# Patient Record
Sex: MALE
[De-identification: names, ages, dates, MRNs, and addresses within clinical notes are randomized per-mention and may not be internally consistent; named-entity substitution may affect disease eponyms.]

---

## 2017-08-07 PROBLEM — Z00.00 ENCOUNTER FOR PREVENTIVE HEALTH EXAMINATION: Status: ACTIVE | Noted: 2017-08-07

## 2017-10-04 ENCOUNTER — APPOINTMENT (OUTPATIENT)
Dept: ENDOCRINOLOGY | Facility: CLINIC | Age: 67
End: 2017-10-04

## 2018-02-14 ENCOUNTER — MEDICATION RENEWAL (OUTPATIENT)
Age: 68
End: 2018-02-14

## 2018-03-04 ENCOUNTER — MEDICATION RENEWAL (OUTPATIENT)
Age: 68
End: 2018-03-04

## 2018-03-05 ENCOUNTER — MEDICATION RENEWAL (OUTPATIENT)
Age: 68
End: 2018-03-05

## 2018-04-02 ENCOUNTER — MEDICATION RENEWAL (OUTPATIENT)
Age: 68
End: 2018-04-02

## 2018-06-01 ENCOUNTER — MEDICATION RENEWAL (OUTPATIENT)
Age: 68
End: 2018-06-01

## 2018-06-06 ENCOUNTER — APPOINTMENT (OUTPATIENT)
Dept: ENDOCRINOLOGY | Facility: CLINIC | Age: 68
End: 2018-06-06
Payer: MEDICARE

## 2018-06-06 VITALS
SYSTOLIC BLOOD PRESSURE: 126 MMHG | HEART RATE: 69 BPM | TEMPERATURE: 98 F | WEIGHT: 175 LBS | HEIGHT: 66 IN | OXYGEN SATURATION: 96 % | BODY MASS INDEX: 28.12 KG/M2 | DIASTOLIC BLOOD PRESSURE: 73 MMHG

## 2018-06-06 DIAGNOSIS — Z82.5 FAMILY HISTORY OF ASTHMA AND OTHER CHRONIC LOWER RESPIRATORY DISEASES: ICD-10-CM

## 2018-06-06 DIAGNOSIS — Z80.42 FAMILY HISTORY OF MALIGNANT NEOPLASM OF PROSTATE: ICD-10-CM

## 2018-06-06 DIAGNOSIS — H91.93 UNSPECIFIED HEARING LOSS, BILATERAL: ICD-10-CM

## 2018-06-06 DIAGNOSIS — N52.9 MALE ERECTILE DYSFUNCTION, UNSPECIFIED: ICD-10-CM

## 2018-06-06 PROCEDURE — 99214 OFFICE O/P EST MOD 30 MIN: CPT

## 2018-06-07 ENCOUNTER — MEDICATION RENEWAL (OUTPATIENT)
Age: 68
End: 2018-06-07

## 2018-06-08 PROBLEM — H91.93 HEARING DEFICIT, BILATERAL: Status: ACTIVE | Noted: 2018-06-06

## 2018-06-09 RX ORDER — SILDENAFIL CITRATE 50 MG/1
50 TABLET, FILM COATED ORAL
Refills: 0 | Status: ACTIVE | COMMUNITY
Start: 2018-06-09

## 2018-07-02 ENCOUNTER — MEDICATION RENEWAL (OUTPATIENT)
Age: 68
End: 2018-07-02

## 2018-10-22 ENCOUNTER — APPOINTMENT (OUTPATIENT)
Dept: ENDOCRINOLOGY | Facility: CLINIC | Age: 68
End: 2018-10-22
Payer: MEDICARE

## 2018-10-22 VITALS
HEART RATE: 78 BPM | BODY MASS INDEX: 28.12 KG/M2 | OXYGEN SATURATION: 95 % | WEIGHT: 175 LBS | HEIGHT: 66 IN | TEMPERATURE: 97.9 F | DIASTOLIC BLOOD PRESSURE: 63 MMHG | SYSTOLIC BLOOD PRESSURE: 135 MMHG

## 2018-10-22 PROCEDURE — 99214 OFFICE O/P EST MOD 30 MIN: CPT

## 2019-01-22 ENCOUNTER — MEDICATION RENEWAL (OUTPATIENT)
Age: 69
End: 2019-01-22

## 2019-03-24 ENCOUNTER — MEDICATION RENEWAL (OUTPATIENT)
Age: 69
End: 2019-03-24

## 2019-04-25 ENCOUNTER — APPOINTMENT (OUTPATIENT)
Dept: ENDOCRINOLOGY | Facility: CLINIC | Age: 69
End: 2019-04-25
Payer: MEDICARE

## 2019-04-25 VITALS
BODY MASS INDEX: 28 KG/M2 | DIASTOLIC BLOOD PRESSURE: 78 MMHG | WEIGHT: 174.25 LBS | SYSTOLIC BLOOD PRESSURE: 148 MMHG | HEIGHT: 66 IN | OXYGEN SATURATION: 95 % | HEART RATE: 73 BPM | TEMPERATURE: 97.8 F

## 2019-04-25 PROCEDURE — 99214 OFFICE O/P EST MOD 30 MIN: CPT

## 2019-04-27 NOTE — PHYSICAL EXAM
[Alert] : alert [Well Nourished] : well nourished [Healthy Appearance] : healthy appearance [Normal Sclera/Conjunctiva] : normal sclera/conjunctiva [PERRL] : pupils equal, round and reactive to light [EOMI] : extra ocular movement intact [No Proptosis] : no proptosis [No Lid Lag] : no lid lag [Normal Outer Ear/Nose] : the ears and nose were normal in appearance [No Neck Mass] : no neck mass was observed [No LAD] : no lymphadenopathy [Thyroid Not Enlarged] : the thyroid was not enlarged [Clear to Auscultation] : lungs were clear to auscultation bilaterally [Normal Rate] : heart rate was normal  [Regular Rhythm] : with a regular rhythm [Carotids Normal] : carotid pulses were normal with no bruits [No Edema] : there was no peripheral edema [Not Tender] : non-tender [Soft] : abdomen soft [No HSM] : no hepato-splenomegaly [Normal] : normal and non tender [No CVA Tenderness] : no ~M costovertebral angle tenderness [Normal Gait] : normal gait [No Joint Swelling] : no joint swelling seen [Normal Strength/Tone] : muscle strength and tone were normal [No Involuntary Movements] : no involuntary movements were seen [No Rash] : no rash [No Tremors] : no tremors [Normal Sensation on Monofilament Testing] : normal sensation on monofilament testing of lower extremities [Normal Affect] : the affect was normal [Normal Mood] : the mood was normal [Gynecomastia] : no gynecomastia [Kyphosis] : no kyphosis present [Foot Ulcers] : no foot ulcers [Acanthosis Nigricans] : no acanthosis nigricans [de-identified] : No corneal arcus or xanthelasma [de-identified] : Hearing (with his hearing aids) is grossly normal [de-identified] : No murmurs [de-identified] : DP pulses 3+ bilaterally [de-identified] : No cervical or supraclavicular adenopathy [de-identified] : Vibratory sensation mildly decreased over the toes

## 2019-04-27 NOTE — ASSESSMENT
[FreeTextEntry1] : 1) Type 1 DM:  A1c level remains moderately elevated, with the usual barriers to improved control--intrinsic "brittleness" (especially overnight) plus under-bolusing for meals because of frequent hypoglycemia between meals from increased physical activity (plus his fear of hypoglycemia).\par --Discussed with him once again that he is clearly a candidate for a pump--which would almost certainly help his overnight glycemic instability, plus decrease the frequency of daytime hypoglycemia because he would be able to use temporary (decreased) basal rates during periods of increased physical activity.  He remains reluctant to pursue this (though seems to be giving it more consideration than in the past).\par --Given his unstable glycemic profile and frequent hypoglycemia, he is also a candidate for a CGM (which would probably be a good first step toward introducing him to current diabetes "technology.")  \par Discussed the CGM with him once again, and emphasized that Medicare is now covering Dexcom devices,  Pointed out that the CGM would not only warn him about hypoglycemia, but also warn him about rapidly falling blood sugars so that he could "intercept" any impending hypoglycemia.  The CGM would also allow him (and me) to assess his overnight glycemic trends.  He indicated that he would consider this.\par --For now, given his elevated morning fingersticks, rising blood sugars overnight and lack of overnight hypoglycemia, will increase his Basaglar back to 16 units.\par \par 2) Exertional dyspnea:  Unclear whether his dyspnea is actually provoked by exertion.  Appears to be most noticeable when he is bending over or when crouching for a long period of time.  His cardiac and pulmonary workups were also negative--(though given his type I diabetes, it is possible that he has mild LV dysfunction from small vessel disease).\par \par Pt is to let me know if he will be pursuing obtaining a Dexcom.\par See for follow-up in 4-5 months.  CMP, lipids, A1c, TFTs before the visit. [FreeTextEntry2] : Advantages of insulin pumps, CGMs

## 2019-04-27 NOTE — DATA REVIEWED
[FreeTextEntry1] : [facility]  (4/19/19)\par \par ,  A1c 8.0%\par LDL 47, HDL 51, , non-HDL cholesterol 85\par Urine microalbumin undetectable

## 2019-04-27 NOTE — REVIEW OF SYSTEMS
[Fatigue] : no fatigue [Decreased Appetite] : appetite not decreased [Recent Weight Gain (___ Lbs)] : no recent weight gain [Recent Weight Loss (___ Lbs)] : no recent weight loss [Fever] : no fever [Chills] : no chills [Blurry Vision] : no blurred vision [Dry Eyes] : no dryness of the eyes [Eyes Itch] : no itching of the eyes [Dysphagia] : no dysphagia [Chest Pain] : no chest pain [Palpitations] : no palpitations [Lower Ext Edema] : no lower extremity edema [Shortness Of Breath] : no shortness of breath [Wheezing] : no wheezing was heard [Nausea] : no nausea [Constipation] : no constipation [Diarrhea] : no diarrhea [Heartburn] : no heartburn [Polyuria] : no polyuria [Dysuria] : no dysuria [Joint Pain] : no joint pain [Joint Stiffness] : no joint stiffness [Muscle Cramps] : no muscle cramps [Myalgia] : no myalgia  [Hair Loss] : no hair loss [Dry Skin] : no dry skin [Headache] : no headaches [Tremors] : no tremors [Pain/Numbness of Digits] : no pain/numbness of digits [Difficulty Walking] : no difficulty walking [Depression] : no depression [Anxiety] : no anxiety [Insomnia] : no insomnia [Stress] : no stress [Polydipsia] : no polydipsia [Cold Intolerance] : cold tolerant [Heat Intolerance] : heat tolerant [Easy Bleeding] : no ~M tendency for easy bleeding [Easy Bruising] : no tendency for easy bruising [FreeTextEntry4] : Recent post-nasal drip.  Hearing aids are working satisfactorily. [FreeTextEntry6] : Intermittent cough which he thinks is secondary to the post-nasal drip.  See comments in the HPI re: ERIC [FreeTextEntry8] : Nocturia usually once a night

## 2019-04-27 NOTE — HISTORY OF PRESENT ILLNESS
[FreeTextEntry1] : 69-year-old man who is followed for type I DM.  His diabetes was initially diagnosed in 2003, when he presented with marked hyperglycemia and ketosis.  He has never been in fransico DKA.  His glycemic control has been consistently sub-optimal, with A1c levels which are usually in the 7.5-8.5% range.  The main barriers to improved control are his intrinsic brittleness and his tendency to under-bolus for meals because of frequent hypoglycemia between meals as a result of his physical activity.  Despite the sub-optimal control, he has no complicating retinopathy or nephropathy.  His other active medical problems include BPH and chronic REYES.  A full cardiac work-up and PFTs were done earlier this year to evaluate the REYES, but no cause could be found.\par \francis Continues to test fingersticks 5X/day (with at least one of these each day to check for suspected hypoglycemia), and take 4-5 insulin injections/day. \francis Has been taking 15 units of Basaglar qhs.  Has not had any AM or symptomatic overnight hypoglycemia, but has now noted higher pre-breakfast readings--i.e. usually > 200.  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  He has noted particularly high fingersticks the next morning (i.e. probably from rebound) after these episodes.\francis Taking 3-5 units of Novolog for breakfast, which is bran cereal nearly every morning.  He has not tested any fingersticks after the meal , but has not had any hypoglycemic reactions between breakfast and lunch.\francis Takes 4 units of Novolog for lunch, which is usually a sandwich.  His pre-lunch fingersticks are usually in the mid-100 range, but he has rarely tested after the meal.  He has had only a few hypoglycemic reactions in the mid-afternoon.  He will sometimes snack on a granola bar in the afternoon, but does not cover this with Novolog.  \par His REYES is about the same.  He notices it with certain types of activities but not others.  Notes it particularly when bending over.  Was able to tolerate a combination of light running and walking to get to the office [place] the train station and did not have significant dyspnea.  (Was also not having significant dyspnea when kayaking in the evening last summer and fall), \francis Is up to date with ophth exams.\francis Denies any numbness or paresthesias in his feet.  \francis Saw his urologist 6 months ago--PSA was stable.  Still has nocturia only once a night unless he is markedly hyperglycemic.

## 2019-06-17 ENCOUNTER — APPOINTMENT (OUTPATIENT)
Dept: ENDOCRINOLOGY | Facility: CLINIC | Age: 69
End: 2019-06-17

## 2019-09-25 ENCOUNTER — APPOINTMENT (OUTPATIENT)
Dept: ENDOCRINOLOGY | Facility: CLINIC | Age: 69
End: 2019-09-25
Payer: MEDICARE

## 2019-09-25 VITALS
TEMPERATURE: 97.5 F | OXYGEN SATURATION: 97 % | WEIGHT: 315 LBS | BODY MASS INDEX: 50.62 KG/M2 | HEART RATE: 60 BPM | DIASTOLIC BLOOD PRESSURE: 84 MMHG | HEIGHT: 66 IN | SYSTOLIC BLOOD PRESSURE: 138 MMHG

## 2019-09-25 PROCEDURE — 99214 OFFICE O/P EST MOD 30 MIN: CPT

## 2019-09-27 ENCOUNTER — MEDICATION RENEWAL (OUTPATIENT)
Age: 69
End: 2019-09-27

## 2019-09-29 NOTE — DATA REVIEWED
[FreeTextEntry1] : [facility]  (9/18/19)\par \par FBS 80,  A1c 8.0%\par CMP and CBC WNL\par LDL 86, HDL 55, TG 63\par TSH level 2.9 uU/ml  (0.36-3.74)\par 25-D level excellent at 45 ng/ml

## 2019-09-29 NOTE — ASSESSMENT
[FreeTextEntry1] : 1) Type 1 DM:  A1c level remains moderately above goal at 8.0%. The barriers to improved glycemic control are unchanged--still a combination of his intrinsic "brittleness," plus inadequate Humalog boluses at meals because of his fear of hypoglycemia when he is physically active between meals.  The question of "silent" overnight hypoglycemia with Somogyi-type rebound persists despite the decrease in his glargine dose, possibly occurring on nights when he has been physically active after supper--with the hypoglycemia manifesting only as mild interruptions in his sleep.\par --To continue the Basaglar at 15 units for now, but strongly suggested increased monitoring at 3 AM on nights when he is "sleeping lightly" and suspects that he might be hypoglycemic.  (Also needs to be particularly concerned on those nights when he has been sailing or kayaking after supper).\par --Again discussed with him the desirability of obtaining a Dexcom device.  He would clearly benefit from its alarm function for hypoglycemia, which would hopefully provide the sense of security which he would need to be able to increase his pre-meal boluses.  The CGM would also help with assessing his glycemic fluctuations overnight.\par He says that he will think about this.\par \par 2) REYES:  A full cardiac and pulmonary workup for this symptom last year was negative.  I suspect that this is similar to what I have seen in other type I pts of his age, and the problem may well be either small-vessel CAD and/or coronary artery calcification with impaired compliance of the vessels and inability to dilate appropriately during physical activity.\par \par See for follow-up in 3-4 months.  CMP, lipids, A1c, microalb, before the visit [FreeTextEntry2] : Potential benefits of CGM

## 2019-09-29 NOTE — REVIEW OF SYSTEMS
[SOB on Exertion] : shortness of breath during exertion [Fatigue] : no fatigue [Fever] : no fever [Decreased Appetite] : appetite not decreased [Chills] : no chills [Dry Eyes] : no dryness of the eyes [Eyes Itch] : no itching of the eyes [Chest Pain] : no chest pain [Dysphagia] : no dysphagia [Lower Ext Edema] : no lower extremity edema [Palpitations] : no palpitations [Shortness Of Breath] : no shortness of breath [Wheezing] : no wheezing was heard [Nausea] : no nausea [Cough] : no cough [Constipation] : no constipation [Diarrhea] : no diarrhea [Heartburn] : no heartburn [Dysuria] : no dysuria [Polyuria] : no polyuria [Joint Pain] : no joint pain [Joint Stiffness] : no joint stiffness [Myalgia] : no myalgia  [Hair Loss] : no hair loss [Headache] : no headaches [Dry Skin] : no dry skin [Tremors] : no tremors [Pain/Numbness of Digits] : no pain/numbness of digits [Difficulty Walking] : no difficulty walking [Depression] : no depression [Insomnia] : no insomnia [Anxiety] : no anxiety [Stress] : no stress [Cold Intolerance] : cold tolerant [Polydipsia] : no polydipsia [Heat Intolerance] : heat tolerant [Easy Bruising] : no tendency for easy bruising [Easy Bleeding] : no ~M tendency for easy bleeding [FreeTextEntry2] : Has lost 5 lb since his last visit [FreeTextEntry8] : Nocturia 1-2X/night [FreeTextEntry4] : Hearing is markedly improved with his hearing aids, though he complains of recent "clicking" and a wax buildup in his R ear [FreeTextEntry3] : Still has a "floater" in his L eye that he finds very annoying and which affects his vision in that eye.  Saw an ophthalmologist who offered surgery--pt is considering this. [FreeTextEntry9] : More frequent nocturnal leg cramps

## 2019-09-29 NOTE — PHYSICAL EXAM
[Alert] : alert [Well Nourished] : well nourished [Healthy Appearance] : healthy appearance [PERRL] : pupils equal, round and reactive to light [Normal Sclera/Conjunctiva] : normal sclera/conjunctiva [EOMI] : extra ocular movement intact [No Proptosis] : no proptosis [No Lid Lag] : no lid lag [Normal Outer Ear/Nose] : the ears and nose were normal in appearance [No LAD] : no lymphadenopathy [No Neck Mass] : no neck mass was observed [Thyroid Not Enlarged] : the thyroid was not enlarged [Clear to Auscultation] : lungs were clear to auscultation bilaterally [Normal Rate] : heart rate was normal  [Regular Rhythm] : with a regular rhythm [Carotids Normal] : carotid pulses were normal with no bruits [No Edema] : there was no peripheral edema [Not Tender] : non-tender [Soft] : abdomen soft [No HSM] : no hepato-splenomegaly [Normal] : normal and non tender [No CVA Tenderness] : no ~M costovertebral angle tenderness [Normal Gait] : normal gait [No Joint Swelling] : no joint swelling seen [Normal Strength/Tone] : muscle strength and tone were normal [No Involuntary Movements] : no involuntary movements were seen [No Rash] : no rash [No Tremors] : no tremors [Normal Sensation on Monofilament Testing] : normal sensation on monofilament testing of lower extremities [Normal Affect] : the affect was normal [Normal Mood] : the mood was normal [Gynecomastia] : no gynecomastia [Kyphosis] : no kyphosis present [Acanthosis Nigricans] : no acanthosis nigricans [Foot Ulcers] : no foot ulcers [de-identified] : Faint corneal arcus without xanthelasma [de-identified] : DP pulses 2+ bilaterally [de-identified] : No murmurs [de-identified] : Hearing is noticeably improved.  Has mild cerumen buildup in his R ear [de-identified] : Vibratory sensation mildly decreased over the toes [de-identified] : No cervical or supraclavicular adenopathy

## 2019-10-07 ENCOUNTER — MEDICATION RENEWAL (OUTPATIENT)
Age: 69
End: 2019-10-07

## 2020-01-31 ENCOUNTER — APPOINTMENT (OUTPATIENT)
Dept: ENDOCRINOLOGY | Facility: CLINIC | Age: 70
End: 2020-01-31
Payer: MEDICARE

## 2020-01-31 VITALS
SYSTOLIC BLOOD PRESSURE: 129 MMHG | DIASTOLIC BLOOD PRESSURE: 74 MMHG | HEIGHT: 66 IN | HEART RATE: 82 BPM | OXYGEN SATURATION: 98 % | BODY MASS INDEX: 28.12 KG/M2 | WEIGHT: 175 LBS | TEMPERATURE: 97.7 F

## 2020-01-31 PROCEDURE — 99214 OFFICE O/P EST MOD 30 MIN: CPT

## 2020-02-03 ENCOUNTER — APPOINTMENT (OUTPATIENT)
Dept: ENDOCRINOLOGY | Facility: CLINIC | Age: 70
End: 2020-02-03

## 2020-03-25 NOTE — HISTORY OF PRESENT ILLNESS
[FreeTextEntry1] : 69-year-old man who is followed for type I DM.  His diabetes was initially diagnosed in 2003, when he presented with marked hyperglycemia and ketosis.  He has never been in fransico DKA.  His glycemic control has been consistently sub-optimal, with A1c levels which are usually in the 7.5-8.5% range.  The main barriers to improved control are his intrinsic brittleness and his tendency to under-bolus for meals because of frequent hypoglycemia between meals as a result of his physical activity.  Despite the sub-optimal control, he has no complicating retinopathy or nephropathy.  His other active medical problems include BPH and chronic REYES.  A full cardiac work-up and PFTs were done when the REYES first manifested in 2018, but no cause could be found.\par \francis Says that his blood sugars have been higher overall, mostly because of elevated values during the holidays.  \francis Has no new physical complaints, though he still has the same REYES.  \francis Had an ophth exam last month which was apparently negative for retinopathy.\francis Is still taking 15 units of Basaglar qhs.  Has had one hypoglycemic reaction in the middle of the night, which apparently came after he had taken a correction dose of Novolog at bedtime the night before.  Will have pre-breakfast values below 70-80 approximately twice a month, but is not sure whether these also occur after bedtime coverage doses of Novolog.\par The insulin reaction in the middle of the night apparently awoke him--describes sleeping more "lightly" if he is hypoglycemic during the night, and also usually has chills.  His hypoglycemic warning symptoms during the day are somewhat blunted, and usually appear only below blood sugars of 50.  (He can be asymptomatic with a blood sugar of 55 mg%).\par His pre-meal Novolog doses average 3/4/8 units.  He will usually take bedtime correction doses only above 200 mg%.\francis Is still testing fingersticks 4-5X/day, but did not bring a log of his readings.  Is doing most of his testing pre-meal and bedtime, very few at two hours post-prandially.  Also does at least one extra reading a day to check for suspected hypoglycemia.\francis Has not seen his urologist or cardiologist in the past year.

## 2020-03-25 NOTE — REVIEW OF SYSTEMS
[SOB on Exertion] : shortness of breath during exertion [Fatigue] : no fatigue [Decreased Appetite] : appetite not decreased [Fever] : no fever [Chills] : no chills [Dry Eyes] : no dryness of the eyes [Eyes Itch] : no itching of the eyes [Dysphagia] : no dysphagia [Chest Pain] : no chest pain [Palpitations] : no palpitations [Leg Claudication] : no intermittent leg claudication [Lower Ext Edema] : no lower extremity edema [Shortness Of Breath] : no shortness of breath [Wheezing] : no wheezing was heard [Cough] : no cough [Nausea] : no nausea [Constipation] : no constipation [Diarrhea] : no diarrhea [Heartburn] : no heartburn [Polyuria] : no polyuria [Dysuria] : no dysuria [Joint Pain] : no joint pain [Joint Stiffness] : no joint stiffness [Muscle Cramps] : no muscle cramps [Myalgia] : no myalgia  [Hair Loss] : no hair loss [Dry Skin] : no dry skin [Headache] : no headaches [Tremors] : no tremors [Pain/Numbness of Digits] : no pain/numbness of digits [Difficulty Walking] : no difficulty walking [Depression] : no depression [Anxiety] : no anxiety [Insomnia] : no insomnia [Stress] : no stress [Polydipsia] : no polydipsia [Cold Intolerance] : cold tolerant [Heat Intolerance] : heat tolerant [Easy Bleeding] : no ~M tendency for easy bleeding [Easy Bruising] : no tendency for easy bruising [FreeTextEntry2] : Has gained 6 lb since his last visit [FreeTextEntry3] : Will have mild blurred vision if his blood sugars are particularly high [FreeTextEntry4] : Hearing aids are working quite well [FreeTextEntry6] : D [FreeTextEntry8] : Nocturia once a night

## 2020-03-25 NOTE — ASSESSMENT
[FreeTextEntry1] : 1) Type 1 DM:  A1c level is markedly increased, and higher than his usual baseline values.  His report of higher fingersticks during the past 2 months is therefore concordant with the results of the A1c level, and presumably reflects dietary lapses over the holidays--(which also likely account for his 6 lb weight gain).\par --To continue his current regimen for now.\par --Again discussed the potential advantages of a pump and/or CGM--options of separate systems vs a closed loop device such as the Medtronic 670.  With the pt remaining reluctant, told him that if did only one of these that a Dexcom would be more useful--primarily in terms of its warning function for hypoglycemia given his moderate hypoglycemia unawareness.  It would obviously provide additional safety (especially overnight), but also might allow him to tighten his post-prandial control, knowing that the pump would alert him promptly to falling blood sugars.\par Once again, he says that he will think about it and let me know.\par (Suggested that he go to the Dexcom and Medtronic web sites to read about these devices)\par \par 2) REYES:  Etiology of this remains unclear.  Cardiology and pulmonary workup were negative.  His exercise tolerance may have improved somewhat during the past year, and he says that he now "lives with it."\par Based on my experience with other type I pts of a similar age, the problem of exertional dyspnea seems to be fairly common--?? due to small vessel disease, ?? to calcified coronaries which are not severely stenotic but do not dilate in response to exercise\par \par See for follow-up in 3-4 months.  CMP, lipids, A1c, TFTs, microalb ratio before the visit [FreeTextEntry2] : Pumps/ CGMs

## 2020-03-25 NOTE — PHYSICAL EXAM
[Alert] : alert [Well Nourished] : well nourished [Healthy Appearance] : healthy appearance [Normal Sclera/Conjunctiva] : normal sclera/conjunctiva [PERRL] : pupils equal, round and reactive to light [EOMI] : extra ocular movement intact [No Proptosis] : no proptosis [No Lid Lag] : no lid lag [Normal Outer Ear/Nose] : the ears and nose were normal in appearance [No Neck Mass] : no neck mass was observed [No LAD] : no lymphadenopathy [Thyroid Not Enlarged] : the thyroid was not enlarged [Normal Rate and Effort] : normal respiratory rhythm and effort [Clear to Auscultation] : lungs were clear to auscultation bilaterally [Normal Rate] : heart rate was normal  [Regular Rhythm] : with a regular rhythm [Carotids Normal] : carotid pulses were normal with no bruits [No Edema] : there was no peripheral edema [Not Tender] : non-tender [Soft] : abdomen soft [No HSM] : no hepato-splenomegaly [Normal] : normal and non tender [No CVA Tenderness] : no ~M costovertebral angle tenderness [Normal Gait] : normal gait [No Joint Swelling] : no joint swelling seen [Normal Strength/Tone] : muscle strength and tone were normal [No Involuntary Movements] : no involuntary movements were seen [No Rash] : no rash [No Tremors] : no tremors [Normal Sensation on Monofilament Testing] : normal sensation on monofilament testing of lower extremities [Normal Affect] : the affect was normal [Normal Mood] : the mood was normal [Gynecomastia] : no gynecomastia [Kyphosis] : no kyphosis present [Foot Ulcers] : no foot ulcers [Acanthosis Nigricans] : no acanthosis nigricans [de-identified] : No corneal arcus or xanthelasma [de-identified] : Hearing aids in both ears, though his hearing acuity is still mildly decreased  [de-identified] : No murmurs [de-identified] : DP pulses 3+ bilaterally [de-identified] : No cervical or supraclavicular adenopathy [de-identified] : Vibratory sensation moderately decreased over the toes

## 2020-03-25 NOTE — DATA REVIEWED
[FreeTextEntry1] : [facility]  (1/29/20)\par \par ,  A1c  9.1%\par C-peptide < 0.1 ng/ml\par CMP and CBC WNL\par LDL 87, HDL 55, TG 70\par Urine microalbumin normal at 13 gm/l, but no microalb/creat ratio done\par

## 2020-07-23 ENCOUNTER — APPOINTMENT (OUTPATIENT)
Dept: ENDOCRINOLOGY | Facility: CLINIC | Age: 70
End: 2020-07-23
Payer: MEDICARE

## 2020-07-23 VITALS
WEIGHT: 174 LBS | DIASTOLIC BLOOD PRESSURE: 69 MMHG | OXYGEN SATURATION: 97 % | HEART RATE: 77 BPM | TEMPERATURE: 98.2 F | HEIGHT: 66 IN | BODY MASS INDEX: 27.97 KG/M2 | SYSTOLIC BLOOD PRESSURE: 139 MMHG | RESPIRATION RATE: 16 BRPM

## 2020-07-23 DIAGNOSIS — N40.0 BENIGN PROSTATIC HYPERPLASIA WITHOUT LOWER URINARY TRACT SYMPMS: ICD-10-CM

## 2020-07-23 PROCEDURE — 99214 OFFICE O/P EST MOD 30 MIN: CPT

## 2020-07-25 PROBLEM — N40.0 BPH (BENIGN PROSTATIC HYPERPLASIA): Status: ACTIVE | Noted: 2018-06-06

## 2020-07-29 ENCOUNTER — RX RENEWAL (OUTPATIENT)
Age: 70
End: 2020-07-29

## 2020-11-10 NOTE — PHYSICAL EXAM
[Alert] : alert [Well Nourished] : well nourished [Healthy Appearance] : healthy appearance [Normal Sclera/Conjunctiva] : normal sclera/conjunctiva [EOMI] : extra ocular movement intact [PERRL] : pupils equal, round and reactive to light [No Proptosis] : no proptosis [No Lid Lag] : no lid lag [Normal Outer Ear/Nose] : the ears and nose were normal in appearance [No Neck Mass] : no neck mass was observed [No LAD] : no lymphadenopathy [Thyroid Not Enlarged] : the thyroid was not enlarged [No Thyroid Nodules] : no palpable thyroid nodules [Clear to Auscultation] : lungs were clear to auscultation bilaterally [Normal Rate] : heart rate was normal [Regular Rhythm] : with a regular rhythm [Carotids Normal] : carotid pulses were normal with no bruits [No Edema] : no peripheral edema [Not Tender] : non-tender [Soft] : abdomen soft [No HSM] : no hepato-splenomegaly [Normal Supraclavicular Nodes] : no supraclavicular lymphadenopathy [Normal Anterior Cervical Nodes] : no anterior cervical lymphadenopathy [No CVA Tenderness] : no ~M costovertebral angle tenderness [Normal Gait] : normal gait [No Involuntary Movements] : no involuntary movements were seen [No Joint Swelling] : no joint swelling seen [Normal Strength/Tone] : muscle strength and tone were normal [No Tremors] : no tremors [Normal Sensation on Monofilament Testing] : normal sensation on monofilament testing of lower extremities [Normal Affect] : the affect was normal [Normal Mood] : the mood was normal [Kyphosis] : no kyphosis present [Acanthosis Nigricans] : no acanthosis nigricans [Foot Ulcers] : no foot ulcers [de-identified] : No corneal arcus or xanthelasma [de-identified] : Hearing is still mildly impaired despite the hearing aids [de-identified] : No murmurs [de-identified] : DP pulses 2+ bilaterally [de-identified] : Area of distinct lipohypertrophy to the left of the umbilicus, with a smaller area to the right of the umbilicus.  (No lipohypertrophy noted in the biceps area or thighs, where he now does most of his injections) [de-identified] : Vibratory sensation mildly decreased over the toes

## 2020-11-10 NOTE — REVIEW OF SYSTEMS
[SOB on Exertion] : shortness of breath on exertion [Fatigue] : no fatigue [Decreased Appetite] : appetite not decreased [Recent Weight Gain (___ Lbs)] : no recent weight gain [Recent Weight Loss (___ Lbs)] : no recent weight loss [Fever] : no fever [Chills] : no chills [Dry Eyes] : no dryness [Blurred Vision] : no blurred vision [Eyes Itch] : no itch [Dysphagia] : no dysphagia [Chest Pain] : no chest pain [Palpitations] : no palpitations [Lower Ext Edema] : no lower extremity edema [Shortness Of Breath] : no shortness of breath [Cough] : no cough [Wheezing] : no wheezing [Nausea] : no nausea [Constipation] : no constipation [Diarrhea] : no diarrhea [Polyuria] : no polyuria [Dysuria] : no dysuria [Muscle Weakness] : no muscle weakness [Myalgia] : no myalgia  [Muscle Cramps] : no muscle cramps [Hair Loss] : no hair loss [Ulcer] : no ulcer [Headaches] : no headaches [Difficulty Walking] : no difficulty walking [Tremors] : no tremors [Pain/Numbness of Digits] : no pain/numbness of digits [Depression] : no depression [Insomnia] : no insomnia [Stress] : no stress [Polydipsia] : no polydipsia [Easy Bleeding] : no ~M tendency for easy bleeding [Easy Bruising] : no tendency for easy bruising [FreeTextEntry3] : The "floater" in his L eye has become larger and more disturbing [FreeTextEntry4] : Hearing aids are functioning satisfactorily [FreeTextEntry7] : Recent increase in reflux symptoms with heartburn  [FreeTextEntry8] : Nocturia 1-2X/night [FreeTextEntry9] : Intermittent discomfort in his left knee

## 2020-11-10 NOTE — DATA REVIEWED
[FreeTextEntry1] : [facility]  (7/17/20)\par \par ,  A1c 8.4%\par CMP WNL\par LDL 84, HDL 55, TG 62\par TSH level normal at 2.39 uU/ml  (0.36-3.74)\par

## 2020-11-10 NOTE — ASSESSMENT
[FreeTextEntry1] : 1) Type I DM:  A1c level is again distinctly above goal.  Most of his hyperglycemia is almost certainly occurring after breakfast and lunch, where he is still under-bolusing in an attempt to avoid hypoglycemia between meals.  His glycemic instability overnight is probably due to his intrinsic "brittleness."  The latter would almost certainly be helped by a pump (which he will not accept)\par --To continue the Basaglar at 15 units qhs for now\par --Cautioned him to avoid the area of lipohypertrophy on his lower abdomen, and demonstrated areas on the upper half of the abdominal wall which would be suitable for injection (and preferable to using his arms)\par --Again emphasized that he is almost certainly under-bolusing for meals, and that he is no longer having the same frequency of hypoglycemia between meals because his physical activity has decreased--(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  Strongly encouraged him to increase his testing after breakfast and lunch, and increase his Novolog sufficiently to bring his 2-hr reading just to approximately 150 mg% (which still leaves a reasonable "cushion" against hypoglycemia)\par --Suggested that he do occasional 3 AM fingersticks to rule out "silent" hypoglycemia\par --Continue testing fingersticks 5X/day\par --Once again encouraged him to consider obtaining a Dexcom CGM, emphasizing how helpful the data would be, and also its ability to warn him regarding decreasing blood sugars (which would hopefully allow him to feel more comfortable increasing his meal boluses)\par \par 2) REYES:  Appears to be stable in severity.  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  \par \par 3) BPH:  Symptoms are stable, but he needs to see his urologist for follow-up\par \par See for follow-up in 3-4 months.  CMP, lipids, A1c, microalb, CBC, 25-D before the visit.\par \par He also plans to see the retinologist once again re: possible surgery for the floater in the left eye [FreeTextEntry2] : Pre-meal bolusing, post-prandial testing

## 2020-11-10 NOTE — HISTORY OF PRESENT ILLNESS
[FreeTextEntry1] : 70-year-old man who is followed for type I DM.  His diabetes was initially diagnosed in 2003, when he presented with marked hyperglycemia and ketosis.  He has never been in fransico DKA.  His glycemic control has been consistently sub-optimal, with A1c levels which are usually in the 7.5-8.5% range.  The main barriers to improved control are his intrinsic brittleness and his tendency to under-bolus for meals because of frequent hypoglycemia between meals as a result of his physical activity.  Despite the sub-optimal control, he has no complicating retinopathy or nephropathy.  His other active medical problems include BPH and chronic REYES.  A full cardiac work-up and PFTs were done when the REYES first manifested in 2018, but no cause could be found.\francis mondragon Has not had any significant medical issues since his last visit, and has no new physical complaints.\francis Continues to test fingersticks 5X/day, but did not bring a written log of his readings.\francis --Taking 15 units of glargine at bedtime.  Pre-breakfast fingersticks continue to fluctuate, though he says that he has had overnight hypoglycemic reactions only once a month.  (These do not necessarily occur after he has taken a correction dose of Novolog for an elevated bedtime fingerstick).\francis --Taking 3 units of Novolog for breakfast, which is cold cereal nearly every morning.  He is still not testing 2 hours after the meal, but his fingersticks before lunch will usually be in the high 100 range.\par --Lunch is a sandwich for which he takes 4 units of Novolog.  Does not test 2 hours after this meal either.\par --Readings before supper are variable, not always correlating with whether or not he had a snack (usually sugar-free yogurt) in the afternoon.  Starches at supper are also variable--rice (white or brown), potato (regular or sweet potato) or pasta.  Notes distinctly lower sugars in the evening after sweet potatoes, but doesn’'t usually take less insulin for those meals.\par --Bedtime fingersticks are also quite variable.  If he goes to sleep with a blood sugar between 100 and 150 and takes 15 units of Basaglar, his fingerstick the next morning could be anywhere between 100 and 200.\francis mondragon Has been exercising most mornings after breakfast, and also sailing on weekends.  He has not been doing any kayaking after supper.\francis Last ophth exam was a year ago, and was negative for retinopathy.\par He denies any numbness or paresthesias in his feet.\francis Has not seen either his urologist or cardiologist for follow-up.

## 2021-01-14 ENCOUNTER — APPOINTMENT (OUTPATIENT)
Dept: ENDOCRINOLOGY | Facility: CLINIC | Age: 71
End: 2021-01-14
Payer: MEDICARE

## 2021-01-14 VITALS
OXYGEN SATURATION: 97 % | RESPIRATION RATE: 16 BRPM | HEIGHT: 66 IN | BODY MASS INDEX: 28.93 KG/M2 | HEART RATE: 69 BPM | SYSTOLIC BLOOD PRESSURE: 145 MMHG | WEIGHT: 180 LBS | DIASTOLIC BLOOD PRESSURE: 68 MMHG | TEMPERATURE: 97.4 F

## 2021-01-14 PROCEDURE — 99215 OFFICE O/P EST HI 40 MIN: CPT

## 2021-01-18 NOTE — ASSESSMENT
[FreeTextEntry1] : 1) Type 1 DM:  A1c level is once again well above goal, almost certainly due primarily to post-prandial hyperglycemia.  HIs tendency to "under-bolus" for meals (in order to provide a cushion against hypoglycemia) remains a significant barrier to improved control.  His pre-breakfast fingersticks are also above target nearly half the time, mostly as carryovers from elevated glucoses the night before, but sometimes reflecting an overnight rise.  In terms of the latter, however, need to determine how often this reflects his tendency to take precautionary snacks for any bedtime fingerstick below 150 mg%.\par --Again strongly encouraged him to test 2 hours after meals, and target these values to approximately 150-160 mg%, since this should provide an adequate cushion against hypoglycemia (which has actually been occurring much less frequently because he is not on is feet as much during the day).  Also emphasized that his breakfast and lunch do not vary day to day, that he should be able to work out appropriate Novolog doses for these meals, and thereby "lock in" adequate glycemic control for almost half of his day.\par --Pointed out to him that his risk of overnight hypoglycemia has lessened significantly with the decrease in his glargine dose (which used to be as high as 26 units).  Suggested that he begin to "ratchet down" his threshold for a bedtime precautionary snack by 10 mg% every few weeks.\par --Finally, I once again encouraged him to consider obtaining a Dexcom device--partly to provide real-time data regarding post-prandial glycemic excursions, and partly for its alarm function--(which would hopefully allay his fears about hypoglycemia and allow him to be more aggressive about his insulin dosing).\par \par 2) Hypercholesterolemia:  Pt has refused statin therapy when suggested on multiple occasions in the past, though his LDL-cholesterol levels were generally below 100 mg%.  He is now slightly above this target.  Will continue to follow, "push" him on this issue if his LDL levels remain in the current range.\par \par He is due for his yearly ophth follow-up--he will schedule an appointment.  (Also gave him the names of two retina specialists for possible "second opinions" regarding surgery for the floater in his left eye--Dr. Luciano ([facility]) and Dr. Millard (in [place])\par He is overdue for his yearly skin checks.\par \par See for follow-up in 4 months.  CMP, lipids, A1c, TFTs before the visit\par \par Spent 40 min with the pt, > 50% on counseling regarding barriers to improved control, post-prandial targets, potential advantages of CGM [FreeTextEntry2] : Post-prandial fingerstick monitoring and targets, advantages of CGM

## 2021-01-18 NOTE — DATA REVIEWED
[FreeTextEntry1] : [facility]  (1/8/21)\par \par , A1c  8.2%, C-peptide < 0.1 ng/ml\par CMP WNL\par , HDL 63, TG 47\par Urine microalbumin undetectable\par CBC normal except for borderline low WBC (3600) but ANC 2180

## 2021-01-18 NOTE — PHYSICAL EXAM
[Alert] : alert [Well Nourished] : well nourished [Healthy Appearance] : healthy appearance [Normal Sclera/Conjunctiva] : normal sclera/conjunctiva [EOMI] : extra ocular movement intact [PERRL] : pupils equal, round and reactive to light [No Proptosis] : no proptosis [No Lid Lag] : no lid lag [Normal Outer Ear/Nose] : the ears and nose were normal in appearance [No Neck Mass] : no neck mass was observed [No LAD] : no lymphadenopathy [Thyroid Not Enlarged] : the thyroid was not enlarged [No Thyroid Nodules] : no palpable thyroid nodules [Clear to Auscultation] : lungs were clear to auscultation bilaterally [No Murmurs] : no murmurs [Normal Rate] : heart rate was normal [Regular Rhythm] : with a regular rhythm [Carotids Normal] : carotid pulses were normal with no bruits [No Edema] : no peripheral edema [Not Tender] : non-tender [Soft] : abdomen soft [No HSM] : no hepato-splenomegaly [Normal Supraclavicular Nodes] : no supraclavicular lymphadenopathy [Normal Anterior Cervical Nodes] : no anterior cervical lymphadenopathy [No CVA Tenderness] : no ~M costovertebral angle tenderness [Normal Gait] : normal gait [No Involuntary Movements] : no involuntary movements were seen [No Joint Swelling] : no joint swelling seen [Normal Strength/Tone] : muscle strength and tone were normal [No Rash] : no rash [No Tremors] : no tremors [Normal Sensation on Monofilament Testing] : normal sensation on monofilament testing of lower extremities [Normal Affect] : the affect was normal [Normal Mood] : the mood was normal [Kyphosis] : no kyphosis present [Acanthosis Nigricans] : no acanthosis nigricans [Foot Ulcers] : no foot ulcers [de-identified] : No corneal arcus or xanthelasma [de-identified] : DP pulses 3+ bilaterally [de-identified] : Hearing remains mildly impaired despite his hearing aids [de-identified] : Vibratory sensation moderately decreased over the toes

## 2021-01-18 NOTE — HISTORY OF PRESENT ILLNESS
[FreeTextEntry1] : 70-year-old man who is followed for type I DM.  His diabetes was initially diagnosed in 2003, when he presented with marked hyperglycemia and ketosis.  He has never been in fransico DKA.  His glycemic control has been consistently sub-optimal, with A1c levels which are usually in the 7.5-8.5% range.  The main barriers to improved control are his intrinsic brittleness and his tendency to under-bolus for meals because of frequent hypoglycemia between meals as a result of his physical activity.  Despite the sub-optimal control, he has no complicating retinopathy or nephropathy.  His other active medical problems include BPH and chronic REYES.  A full cardiac work-up and PFTs were done when the REYES first manifested in 2018, but no cause could be found.\par \francis Feels well, and has not had any significant medical issues since his last visit.\par Taking 15 units of Basaglar qhs, pre-meal Novolog 3/4/8\francis Says that his blood sugars have been higher--partly from holiday eating, partly from  less physical activity.  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 He is still bothered by the floater in his left eye.\par Doing fingersticks 5X/day but did not bring a written log.\par Thinks that his pre-breakfast readings have been "settling down"--but are still above 180 approximately 40% of the time.  Thinks that most of these reflect an overnight rise, but admits that he is still taking a precautionary snack for bedtime readings below 150.  (Was 161 mg% last night at bedtime--took 15 units of Basaglar--was 128 this morning). Has not had any symptomatic overnight hypoglycemia, nor any AM values below 80 mg%.  Says that he now begins to experience hypoglycemic symptoms at 60 mg%, but admits that he has a habit of not always responding promptly to them.\francis Has not been testing 2 hours after meals.\par In terms of his diet:\par --Breakfast is still cereal, without any fruit unless his blood sugar is low\par --Lunch is almost always a sandwich\par --Starch at dinner is variable, and is not measured out.  Pasta is whole wheat, rice is brown rice.\par --Does not usually eat any dessert

## 2021-01-18 NOTE — REVIEW OF SYSTEMS
[Fatigue] : no fatigue [Decreased Appetite] : appetite not decreased [Chills] : no chills [Fever] : no fever [Dry Eyes] : no dryness [Blurred Vision] : no blurred vision [Eyes Itch] : no itch [Dysphagia] : no dysphagia [Chest Pain] : no chest pain [Lower Ext Edema] : no lower extremity edema [Palpitations] : no palpitations [Shortness Of Breath] : no shortness of breath [Orthopnea] : no orthopnea [Wheezing] : no wheezing [Nausea] : no nausea [Heartburn] : no heartburn [Constipation] : no constipation [Diarrhea] : no diarrhea [Polyuria] : no polyuria [Dysuria] : no dysuria [Joint Pain] : no joint pain [Muscle Weakness] : no muscle weakness [Myalgia] : no myalgia  [Joint Stiffness] : no joint stiffness [Dry Skin] : no dry skin [Ulcer] : no ulcer [Hair Loss] : no hair loss [Headaches] : no headaches [Tremors] : no tremors [Depression] : no depression [Anxiety] : no anxiety [Stress] : no stress [Polydipsia] : no polydipsia [Cold Intolerance] : no cold intolerance [Heat Intolerance] : no heat intolerance [Easy Bleeding] : no ~M tendency for easy bleeding [Easy Bruising] : no tendency for easy bruising [FreeTextEntry2] : Has gained 6 lb since his last visit [FreeTextEntry3] : Floater in the left eye is becoming more bothersome [FreeTextEntry4] : Says that his hearing aids are working satisfactorily.  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  REYES is unchanged [FreeTextEntry8] : Still with nocturia 1-2X/night [de-identified] : Has been experiencing occasional "shooting pains" on the inside of his left foot

## 2021-07-15 ENCOUNTER — APPOINTMENT (OUTPATIENT)
Dept: ENDOCRINOLOGY | Facility: CLINIC | Age: 71
End: 2021-07-15
Payer: MEDICARE

## 2021-07-15 VITALS
SYSTOLIC BLOOD PRESSURE: 137 MMHG | DIASTOLIC BLOOD PRESSURE: 80 MMHG | OXYGEN SATURATION: 97 % | HEART RATE: 73 BPM | WEIGHT: 177 LBS | TEMPERATURE: 98.2 F | BODY MASS INDEX: 28.45 KG/M2 | HEIGHT: 66 IN

## 2021-07-15 PROCEDURE — 99214 OFFICE O/P EST MOD 30 MIN: CPT

## 2021-09-06 ENCOUNTER — RX RENEWAL (OUTPATIENT)
Age: 71
End: 2021-09-06

## 2021-09-21 RX ORDER — BLOOD-GLUCOSE,RECEIVER,CONT
EACH MISCELLANEOUS
Qty: 1 | Refills: 3 | Status: ACTIVE | COMMUNITY
Start: 2021-09-21 | End: 1900-01-01

## 2021-10-04 NOTE — ASSESSMENT
[FreeTextEntry1] : 1) Type 1 DM:  A1c level is now significantly elevated, and his glycemic control has worsened since his last visit.  Most of the hyperglycemia is still occurring post-prandially, daisy after breakfast (for which 3 units of Novolog is almost certainly not "covering" the cereal at that meal, with his pre-lunch fingersticks being near or above 200), and after supper (when his bedtime readings are also > 200 at least half the time).  HIs pre-breakfast fingersticks continue to fluctuate, but it is difficult to tell if these reflect intrinsic brittleness or "silent" overnight hypoglycemia with a Somogyi-type rebound.\par --Again strongly encouraged him to start continuous monitoring with a Dexcom, pointing out that this would provide data regarding post-prandial glycemic excursions which he is unable to get by fingerstick monitoring, but more importantly, would allow him to increase his pre-meal Novolog boluses because the warning function of the Dexcom might give him a better sense of security against hypoglycemia.\par The Dexcom would also provide data regarding overnight glycemic patterns and possible "silent" overnight hypoglycemia.\par He now says that he is willing to proceed, and I suggested that he check with his prescription plan (or directly with Dexcom) as to where I should sent the request for supplies.\par \par --Showed him the areas of lipohypertrophy on his abdominal wall and explained how injecting into these areas could lead to problems with erratic absorption of his insulin\par \par See for follow-up in 3-4 months.  CMP, lipids, A1c, microalb before the visit [FreeTextEntry2] : Advantages ot CGM

## 2021-10-04 NOTE — DATA REVIEWED
[FreeTextEntry1] : [facility]  (7/9/21)\par \par ,  A1c 8.9%\par CMP WNL\par LDL 97, HDL 50, TG 45\par TSH 1.59 uU/ml  (0.36-3.74)\par

## 2021-10-04 NOTE — HISTORY OF PRESENT ILLNESS
[FreeTextEntry1] : 71-year-old man who is followed for type I DM.  His diabetes was initially diagnosed in 2003, when he presented with marked hyperglycemia and ketosis.  He has never been in fransico DKA.  His glycemic control has been consistently sub-optimal, with A1c levels which are usually in the 7.5-8.5% range.  The main barriers to improved control are his intrinsic brittleness and his tendency to under-bolus for meals because of frequent hypoglycemia between meals as a result of his physical activity.  Despite the sub-optimal control, he has no complicating retinopathy or nephropathy.  His other active medical problems include BPH and chronic REYES.  A full cardiac work-up and PFTs were done when the REYES first manifested in 2018, but no cause could be found.\par \francis Has not had any significant medical issues since his last visit, and has no new physical complaints.\francis Continues to test fingersticks 5X/day, but did not bring a written log of his readings.  Says that his blood sugars seem to be about the same.\francis Still taking 4 injections of insulin a day (including a fifth at bedtime if he needs a correction dose)\francis Still taking 15 units of Lantus qhs, \par --Pre-breakfast readings continue to fluctuate, with approximately 1/3 being over 200, 10% in the higher 200s.  Has had occasional overnight hypoglycemic reactions, and these have occurred with just the Basaglar--not after a correction dose at bedtime.  (The reactions will usually wake him up, but he admits that he might be having "silent" hypoglycemia on some nights).\francis --Is still taking only 3 units of Novolog for breakfast (which is Bran Flakes), and 4 units for lunch (which is a sandwich).  Does not test 2 hours after either meal.  (Says that it is not that he forgets, more that "I don't think of myself as a diabetic.")  His fingersticks before lunch are usually near or above 200, even if his pre-breakfast fingerstick has been below 150\par --Fingersticks before supper are the lowest of the day.  Has not been having any hypoglycemic reactions in the afternoon, even though he is physically active.  \par --Taking 8-10 units of Novolog before supper.  Fingersticks 2hrs after supper or at bedtime are in the low 200 range approximately half the time.  \francis --Limits his bedtime correction doses to glucoses over 220-230, and starts at 2 units.\francis Has not seen his cardiologist in over 2 years.  HIs REYES is probably somewhat worse, but he thinks that it may be the humidity.

## 2021-10-04 NOTE — REVIEW OF SYSTEMS
[Fatigue] : no fatigue [Decreased Appetite] : appetite not decreased [Fever] : no fever [Chills] : no chills [Dry Eyes] : no dryness [Blurred Vision] : no blurred vision [Eyes Itch] : no itch [Dysphagia] : no dysphagia [Hearing Loss] : hearing loss [Chest Pain] : no chest pain [Palpitations] : no palpitations [Lower Ext Edema] : no lower extremity edema [Shortness Of Breath] : no shortness of breath [Cough] : no cough [Wheezing] : no wheezing [Nausea] : no nausea [SOB on Exertion] : shortness of breath on exertion [Constipation] : no constipation [Heartburn] : no heartburn [Diarrhea] : no diarrhea [Polyuria] : no polyuria [Dysuria] : no dysuria [Joint Pain] : no joint pain [Muscle Weakness] : no muscle weakness [Myalgia] : no myalgia  [Joint Stiffness] : no joint stiffness [Dry Skin] : no dry skin [Hair Loss] : no hair loss [Ulcer] : no ulcer [Headaches] : no headaches [Difficulty Walking] : no difficulty walking [Tremors] : no tremors [Pain/Numbness of Digits] : no pain/numbness of digits [Depression] : no depression [Anxiety] : no anxiety [Stress] : no stress [Polydipsia] : no polydipsia [Easy Bleeding] : no ~M tendency for easy bleeding [Easy Bruising] : no tendency for easy bruising [FreeTextEntry2] : Has lost 3 lb since his last visit [FreeTextEntry4] : Wearing hearing aids [FreeTextEntry3] : Is still bothered by the large "floater" in his left eye [FreeTextEntry8] : Nocturia once a night [FreeTextEntry9] : Nocturnal muscle cramps in his calves

## 2022-01-13 ENCOUNTER — APPOINTMENT (OUTPATIENT)
Dept: ENDOCRINOLOGY | Facility: CLINIC | Age: 72
End: 2022-01-13
Payer: MEDICARE

## 2022-01-13 VITALS
BODY MASS INDEX: 29.46 KG/M2 | DIASTOLIC BLOOD PRESSURE: 74 MMHG | WEIGHT: 179 LBS | HEIGHT: 65.5 IN | TEMPERATURE: 97.5 F | OXYGEN SATURATION: 98 % | HEART RATE: 73 BPM | SYSTOLIC BLOOD PRESSURE: 150 MMHG

## 2022-01-13 PROCEDURE — 99214 OFFICE O/P EST MOD 30 MIN: CPT

## 2022-01-31 NOTE — DATA REVIEWED
[FreeTextEntry1] : [facility]  (1/8/2022)\par \par ,  A1c 8.7%\par CMP WNL\par Urine microalbumin 13 mg/L  (no ratio done)\par CBC--Borderline low WBC (3500), otherwise WNL

## 2022-01-31 NOTE — ASSESSMENT
[FreeTextEntry1] : 1) Type 1 DM:  A1c level is well above goal, and actually higher than most of his previous values.  Although it would have been helpful to be able to see his 24-hr "overview" pattern, it would almost certainly have shown markedly elevated post-prandial glucoses.  He continues to under-dose at meals, even though his fear of post-prandial  hypoglycemia should now have been somewhat ameliorated by the Dexcom's warning alerts--even allowing for the fact that he still has somewhat blunted hypoglycemic warning symptoms.  \par --Since it is unclear whether his overnight hypoglycemia is occurring only after he has taken correction doses of Novolog at bedtime, will continue the Basaglar at 16 units.  (Cautioned him not to take 17 units)\par --Again emphasized to him that his elevated A1c level reflects primarily the excessive post-prandial hyperglycemia, and that he needs to increase his premeal Novolog  to target his post-prandial glucoses to below 150 mg%.  (He likely needs higher doses for all three meals)\par --Cautioned him that he needs to check suspected hypoglycemia with fingersticks if possible, and should calibrate the Dexcom at least every other day\par --Emphasized that the retinopathy noted on has last ophth exam is a new finding, and that retinopathy is the diabetic complication which is most clearly tied to blood glucose control.\par --Needs to download the Clarity katie on his phone\par \par 2) Hypercholesterolemia: Lipid profile was not done on the current bloodwork (despite being ordered).  Previous LDL-cholesterol levels have been approximately 100 mg%, which is essentially at goal, though statin therapy is clearly indicated--both as "adjunctive" therapy for a diabetic, and because his LDL target would ideally be closer to 100 mg%.\par --He remains reluctant to start on a statin\par \par 3) Hypertension:  Systolic BP is moderately elevated today, which has been seen at approximately 1/3 of his visits.   Although this may simply be a "white-coat" exacerbation, he should obviously be on an ACE or ARB regardless, simply for nephro-protection--but he has resisted this.\par --Again discussed ACE/ARB therapy, but he is reluctant to start.\par --Asked him to begin monitoring BPs at home, with target of < 130-140/80-85\par \par See for follow-up in 3-4 months.  CMP, lipids, A1c, TFTs, microalbumin before the visit [FreeTextEntry2] : Post-prandial glucose targets

## 2022-01-31 NOTE — REVIEW OF SYSTEMS
[Hearing Loss] : hearing loss [SOB on Exertion] : shortness of breath on exertion [Recent Weight Gain (___ Lbs)] : recent weight gain: [unfilled] lbs [Blurred Vision] : blurred vision [Fatigue] : no fatigue [Decreased Appetite] : appetite not decreased [Fever] : no fever [Chills] : no chills [Dry Eyes] : no dryness [Eyes Itch] : no itch [Dysphagia] : no dysphagia [Chest Pain] : no chest pain [Palpitations] : no palpitations [Lower Ext Edema] : no lower extremity edema [Shortness Of Breath] : no shortness of breath [Cough] : no cough [Wheezing] : no wheezing [Nausea] : no nausea [Constipation] : no constipation [Heartburn] : no heartburn [Diarrhea] : no diarrhea [Polyuria] : no polyuria [Dysuria] : no dysuria [Joint Pain] : no joint pain [Muscle Weakness] : no muscle weakness [Myalgia] : no myalgia  [Joint Stiffness] : no joint stiffness [Dry Skin] : no dry skin [Hair Loss] : no hair loss [Ulcer] : no ulcer [Headaches] : no headaches [Difficulty Walking] : no difficulty walking [Tremors] : no tremors [Pain/Numbness of Digits] : no pain/numbness of digits [Depression] : no depression [Anxiety] : no anxiety [Stress] : no stress [Polydipsia] : no polydipsia [Easy Bleeding] : no ~M tendency for easy bleeding [Easy Bruising] : no tendency for easy bruising [FreeTextEntry3] : Is still bothered by the large "floater" in his left eye [FreeTextEntry4] : Wearing hearing aids [FreeTextEntry7] : Diarrhea has entirely resolved and bowel movements are back to normal [FreeTextEntry8] : Nocturia once a night [FreeTextEntry9] : Still with intermittent nocturnal muscle cramps

## 2022-01-31 NOTE — HISTORY OF PRESENT ILLNESS
[FreeTextEntry1] : 71-year-old man who is followed for type I DM.  His diabetes was initially diagnosed in 2003, when he presented with marked hyperglycemia and ketosis.  He has never been in fransico DKA.  His glycemic control has been consistently sub-optimal, with A1c levels which are usually in the 7.5-8.5% range.  The main barriers to improved control are his intrinsic brittleness and his tendency to under-bolus for meals because of frequent hypoglycemia between meals as a result of his physical activity.  Despite the sub-optimal control, he has no complicating retinopathy or nephropathy.  His other active medical problems include BPH and chronic REYES.  A full cardiac work-up and PFTs were done when the REYES first manifested in 2018, but no cause could be found.\francis \francis Interim history since is last visit is positive for a diarrheal illness which began somewhat acutely in late October.  Saw a local physician for evaluation., Stool specimens were taken, but results were negative.  Was given omeprazole (but with little effect), and the diarrhea resolved spontaneously after a month.  \francis Received his COVID booster at the end of November--did not have any reaction except arm soreness\francis Has been using the Dexcom full-time.  The sensors are transmitting to his phone, but he has not downloaded the Clarity katie for data analysis.\francis Continues to test fingersticks as often as 4-5X/day--partly to calibrate the Dexcom, otherwise to confirm hypo or marked hyperglycemia.\francis Has been taking 16-17 units of Basaglar qhs.  Is having hypoglycemic reactions in the middle of the night (which is MN-1 AM for him) 1-2X/week.  These are now occurring even on nights when he does not take extra Humalog correction doses at bedtime.  Is being awakened by the Dexcom alarm.\francis Still taking only 3 units of Humalog for breakfast, which is still a bowl of Cheerios.  Glucoses are usually increasing to over 180 after the meal\francis Taking 4 units for lunch, which is almost always a sandwich.  His glucoses are still > 180 most of the time after this meal as well.\francis Starches at supper are rice or potato.  Taking 8 units for this meal, but glucoses are usually over 180.\francis Has started taking correction doses of Humalog between meals if he is over 250 mg%.\par \par Denies any neuropathic symptoms in his feet\par Was apparently told of retinopathy on his last ophth exam, and he thinks that his visual acuity may be affected.

## 2022-01-31 NOTE — PHYSICAL EXAM
[Alert] : alert [Well Nourished] : well nourished [Healthy Appearance] : healthy appearance [Normal Sclera/Conjunctiva] : normal sclera/conjunctiva [EOMI] : extra ocular movement intact [PERRL] : pupils equal, round and reactive to light [No Proptosis] : no proptosis [No Lid Lag] : no lid lag [Normal Outer Ear/Nose] : the ears and nose were normal in appearance [No Neck Mass] : no neck mass was observed [No LAD] : no lymphadenopathy [Thyroid Not Enlarged] : the thyroid was not enlarged [No Thyroid Nodules] : no palpable thyroid nodules [Clear to Auscultation] : lungs were clear to auscultation bilaterally [No Murmurs] : no murmurs [Normal Rate] : heart rate was normal [Regular Rhythm] : with a regular rhythm [Carotids Normal] : carotid pulses were normal with no bruits [No Edema] : no peripheral edema [Not Tender] : non-tender [Soft] : abdomen soft [No HSM] : no hepato-splenomegaly [Normal Supraclavicular Nodes] : no supraclavicular lymphadenopathy [Normal Anterior Cervical Nodes] : no anterior cervical lymphadenopathy [No CVA Tenderness] : no ~M costovertebral angle tenderness [Normal Gait] : normal gait [No Involuntary Movements] : no involuntary movements were seen [No Joint Swelling] : no joint swelling seen [Normal Strength/Tone] : muscle strength and tone were normal [No Rash] : no rash [Normal] : normal [2+] : 2+ in the dorsalis pedis [Vibration Dec.] : diminished vibratory sensation at the level of the toes [No Tremors] : no tremors [Normal Sensation on Monofilament Testing] : normal sensation on monofilament testing of lower extremities [Normal Affect] : the affect was normal [Normal Mood] : the mood was normal [Kyphosis] : no kyphosis present [Acanthosis Nigricans] : no acanthosis nigricans [Foot Ulcers] : no foot ulcers [Delayed in the Right Toes] : normal in the toes [Delayed in the Left Toes] : normal in the toes [Position Sense Dec.] : normal position sense at the level of the toes [#1 Diminished] : number 1 was normal [#2 Diminished] : number 2 was normal [#3 Diminished] : number 3 was normal [#4 Diminished] : number 4 was normal [#5 Diminished] : number 5 was normal [#6 Diminished] : number 6 was normal [#7 Diminished] : number 7 was normal [#8 Diminished] : number 8 was normal [#9 Diminished] : number 9 was normal [#10 Diminished] : number 10 was normal [de-identified] : Mild corneal arcus, mostly the R eye [de-identified] : Hearing is only slightly impaired with the hearing aids in place [de-identified] : DP pulses 2+ bilaterally [de-identified] : Areas of moderate lipohypertrophy on either side of the umbilicus

## 2022-07-13 ENCOUNTER — APPOINTMENT (OUTPATIENT)
Dept: ENDOCRINOLOGY | Facility: CLINIC | Age: 72
End: 2022-07-13

## 2022-07-13 VITALS
HEIGHT: 65.5 IN | DIASTOLIC BLOOD PRESSURE: 73 MMHG | BODY MASS INDEX: 29.79 KG/M2 | WEIGHT: 181 LBS | HEART RATE: 71 BPM | SYSTOLIC BLOOD PRESSURE: 146 MMHG | TEMPERATURE: 98.2 F | OXYGEN SATURATION: 97 %

## 2022-07-13 PROCEDURE — 99214 OFFICE O/P EST MOD 30 MIN: CPT

## 2022-07-19 NOTE — HISTORY OF PRESENT ILLNESS
[FreeTextEntry1] : 72-year-old man who is followed for type I DM.  His diabetes was initially diagnosed in 2003, when he presented with marked hyperglycemia and ketosis.  He has never been in fransico DKA.  His glycemic control has been consistently sub-optimal, with A1c levels which are usually in the 7.5-8.5% range.  The main barriers to improved control are his intrinsic brittleness and his tendency to under-bolus for meals because of frequent hypoglycemia between meals as a result of his physical activity.  Despite the sub-optimal control, he has no complicating retinopathy or nephropathy.  His other active medical problems include BPH and chronic REYES.  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  Says "10%" are over 200, but less than 25% are below 150.  Symptomatic overnight hypoglycemia has been very infrequent\par --Still having cereal for breakfast--Rice Krispies, Cheerios or a plain bran cereal. Does not measure the portions.  Admits that he will sometimes be hypoglycemic after the Rice Krispies.  Otherwise, he will often be over 200 after the meal\par --Has 1 1/2 sandwiches for lunch)--sometimes only one.  Fingersticks after this meal are also usually > 200--which he admits are frankly above target but which provide a "cushion" against afternoon hypoglycemia.    \par --Starch at supper is variable.  If he has brown rice, he measures it out, but does not measure pasta or potatoes\par Admits to blunted hypoglycemic awareness during the past few months\francis Denies any numbness or paresthesias in his feet

## 2022-07-19 NOTE — REVIEW OF SYSTEMS
[Hearing Loss] : hearing loss [SOB on Exertion] : shortness of breath on exertion [Fatigue] : no fatigue [Decreased Appetite] : appetite not decreased [Fever] : no fever [Chills] : no chills [Dry Eyes] : no dryness [Blurred Vision] : no blurred vision [Eyes Itch] : no itch [Dysphagia] : no dysphagia [Chest Pain] : no chest pain [Palpitations] : no palpitations [Lower Ext Edema] : no lower extremity edema [Shortness Of Breath] : no shortness of breath [Cough] : no cough [Wheezing] : no wheezing [Nausea] : no nausea [Constipation] : no constipation [Heartburn] : no heartburn [Diarrhea] : no diarrhea [Polyuria] : no polyuria [Dysuria] : no dysuria [Muscle Weakness] : no muscle weakness [Myalgia] : no myalgia  [Dry Skin] : no dry skin [Hair Loss] : no hair loss [Ulcer] : no ulcer [Headaches] : no headaches [Difficulty Walking] : no difficulty walking [Tremors] : no tremors [Pain/Numbness of Digits] : no pain/numbness of digits [Depression] : no depression [Anxiety] : no anxiety [Stress] : no stress [Polydipsia] : no polydipsia [Easy Bleeding] : no ~M tendency for easy bleeding [Easy Bruising] : no tendency for easy bruising [FreeTextEntry2] : Has gained 2 lb since his last visit [FreeTextEntry3] : Is still bothered by the large "floater" in his left eye.  Vision has significantly improved since his cataract surgery, but says still "not perfect"  [FreeTextEntry4] : Wearing hearing aids [FreeTextEntry8] : Nocturia 1-2X/night [FreeTextEntry9] : Multiple arthralgias

## 2022-07-19 NOTE — PHYSICAL EXAM
[Alert] : alert [Well Nourished] : well nourished [Healthy Appearance] : healthy appearance [Normal Sclera/Conjunctiva] : normal sclera/conjunctiva [EOMI] : extra ocular movement intact [PERRL] : pupils equal, round and reactive to light [No Proptosis] : no proptosis [No Lid Lag] : no lid lag [Normal Outer Ear/Nose] : the ears and nose were normal in appearance [No Neck Mass] : no neck mass was observed [No LAD] : no lymphadenopathy [Thyroid Not Enlarged] : the thyroid was not enlarged [No Thyroid Nodules] : no palpable thyroid nodules [Clear to Auscultation] : lungs were clear to auscultation bilaterally [No Murmurs] : no murmurs [Normal Rate] : heart rate was normal [Regular Rhythm] : with a regular rhythm [Carotids Normal] : carotid pulses were normal with no bruits [No Edema] : no peripheral edema [Not Tender] : non-tender [Soft] : abdomen soft [No HSM] : no hepato-splenomegaly [Normal Supraclavicular Nodes] : no supraclavicular lymphadenopathy [Normal Anterior Cervical Nodes] : no anterior cervical lymphadenopathy [No CVA Tenderness] : no ~M costovertebral angle tenderness [Normal Gait] : normal gait [No Involuntary Movements] : no involuntary movements were seen [No Joint Swelling] : no joint swelling seen [Normal Strength/Tone] : muscle strength and tone were normal [No Rash] : no rash [Normal] : normal [2+] : 2+ in the dorsalis pedis [Vibration Dec.] : diminished vibratory sensation at the level of the toes [No Tremors] : no tremors [Normal Sensation on Monofilament Testing] : normal sensation on monofilament testing of lower extremities [Normal Affect] : the affect was normal [Normal Mood] : the mood was normal [Kyphosis] : no kyphosis present [Acanthosis Nigricans] : no acanthosis nigricans [Foot Ulcers] : no foot ulcers [Delayed in the Right Toes] : normal in the toes [Delayed in the Left Toes] : normal in the toes [Position Sense Dec.] : normal position sense at the level of the toes [#1 Diminished] : number 1 was normal [#2 Diminished] : number 2 was normal [#3 Diminished] : number 3 was normal [#4 Diminished] : number 4 was normal [#5 Diminished] : number 5 was normal [#6 Diminished] : number 6 was normal [#7 Diminished] : number 7 was normal [#8 Diminished] : number 8 was normal [#9 Diminished] : number 9 was normal [#10 Diminished] : number 10 was normal [de-identified] : Faint corneal arcus, mostly the R eye [de-identified] : Hearing is minimally impaired with the hearing aids in place.   [de-identified] : DP pulses 2+ bilaterally [de-identified] : Areas of moderate lipohypertrophy on either side of the umbilicus

## 2022-07-19 NOTE — DATA REVIEWED
[FreeTextEntry1] : [facility]  (7/9/22)\par \par Glucose (non-fasting) 358,  A1c 8.0%\par LDL 95, HDL 38, \par Urine microalbumin undetectable\par TSH level normal at 2.1 uU/ml (0.36-3.7)

## 2022-07-19 NOTE — ASSESSMENT
[FreeTextEntry1] : 1) Type 1 DM:\par --Needs to solve the problem of the Dexcom transmitters--suggested that he contact Dexcom support to discuss this.  (Alternative would be to see if there is a Diabetes Educator available at a local hospital who might be able to help him solve the problem)\par --Continue fingerstick monitoring 5X/day during interruption of CGM use\par --Again strongly advised him to increase his doses of Novolog to bring his 2-hr pp glucose at least down to 150 mg%.  Once he is back on the CGM, should not have to worry as much about hypoglycemia between meals because he will be warned regarding a falling blood sugar.\par --Continue to avoid injecting into the lipohypertrophic areas on his abdominal wall\par --He is apparently not doing the "air shot" when he injects--only does this when he first starts a new pen.  Was reminded that he needs to do this for every injection.  (Pointed out to him that he might be taking only the equivalent of 1 unit of Novolog for his breakfast dose if he omits the air short)\par --With regard to the disturbing alarm on the Dexcom, reminded him that he can switch to vibrate mode when he is in a meeting, etc\par \par 2) Hypercholesterolemia:  LDL-cholesterol level is below the more "liberal" target of 100 mg% for a diabetic, but would prefer a value closer to 70 mg%.  Would also prefer that the pt be on a statin regardless of his LDL given his diabetes and the likelihood that he has at least some CAD despite his negative stress tests.\par --He remains reluctant to start on statin therapy\par \par 3) Hypertension:  Systolic BP is borderline elevated at today's visit.\par --Suggested that he monitor BPs at home, with target < 130-140/80-85\par --Also again reminded him that he would optimally be on an ACE or ARB for nephro-protection, and these would almost certainly bring his BP into the optimal range.  Is not willing to start either of these as well\par \par Health Maintenance:\par --Needs colonoscopy\par --Also suggested that he see a local dermatologist for a complete skin check, given the long hours he spends outdoors\par \par See for follow-up in 3-4 months.  CMP, lipids, A1c before the visit [FreeTextEntry2] : Post-prandial targets, air-shot, injection sites

## 2022-09-27 ENCOUNTER — RX RENEWAL (OUTPATIENT)
Age: 72
End: 2022-09-27

## 2022-10-15 ENCOUNTER — RX RENEWAL (OUTPATIENT)
Age: 72
End: 2022-10-15

## 2022-10-19 ENCOUNTER — RX RENEWAL (OUTPATIENT)
Age: 72
End: 2022-10-19

## 2022-11-18 RX ORDER — BLOOD SUGAR DIAGNOSTIC
STRIP MISCELLANEOUS
Qty: 400 | Refills: 3 | Status: ACTIVE | COMMUNITY
Start: 2018-06-01 | End: 1900-01-01

## 2023-01-13 ENCOUNTER — APPOINTMENT (OUTPATIENT)
Dept: ENDOCRINOLOGY | Facility: CLINIC | Age: 73
End: 2023-01-13
Payer: MEDICARE

## 2023-01-13 VITALS
HEART RATE: 74 BPM | SYSTOLIC BLOOD PRESSURE: 159 MMHG | WEIGHT: 184 LBS | HEIGHT: 65.75 IN | DIASTOLIC BLOOD PRESSURE: 77 MMHG | BODY MASS INDEX: 29.93 KG/M2 | OXYGEN SATURATION: 99 % | TEMPERATURE: 97.5 F

## 2023-01-13 PROCEDURE — 99214 OFFICE O/P EST MOD 30 MIN: CPT

## 2023-01-16 NOTE — REVIEW OF SYSTEMS
[Hearing Loss] : hearing loss [SOB on Exertion] : shortness of breath on exertion [Fatigue] : fatigue [Decreased Appetite] : appetite not decreased [Fever] : no fever [Chills] : no chills [Dry Eyes] : no dryness [Blurred Vision] : no blurred vision [Eyes Itch] : no itch [Dysphagia] : no dysphagia [Chest Pain] : no chest pain [Palpitations] : no palpitations [Lower Ext Edema] : no lower extremity edema [Shortness Of Breath] : no shortness of breath [Cough] : no cough [Wheezing] : no wheezing [Nausea] : no nausea [Heartburn] : no heartburn [Diarrhea] : no diarrhea [Polyuria] : no polyuria [Dysuria] : no dysuria [Joint Pain] : no joint pain [Muscle Weakness] : no muscle weakness [Myalgia] : no myalgia  [Joint Stiffness] : no joint stiffness [Dry Skin] : no dry skin [Hair Loss] : no hair loss [Ulcer] : no ulcer [Headaches] : no headaches [Difficulty Walking] : no difficulty walking [Tremors] : no tremors [Pain/Numbness of Digits] : no pain/numbness of digits [Depression] : no depression [Anxiety] : no anxiety [Stress] : no stress [Polydipsia] : no polydipsia [Easy Bleeding] : no ~M tendency for easy bleeding [Easy Bruising] : no tendency for easy bruising [FreeTextEntry2] : Has gained another 3 lb since his last visit.  Has residual fatigue since his bout with influenza in December [FreeTextEntry3] : Is still bothered by the large "floater" in his left eye.   [FreeTextEntry4] : Wearing hearing aids [FreeTextEntry7] : Intermittent constipation [FreeTextEntry8] : Nocturia once a night

## 2023-01-16 NOTE — ASSESSMENT
[FreeTextEntry2] : injection timing and sites, post-prandial targets [FreeTextEntry1] : 1) Type 1 DM:  A1c level is again well above goal, with post-prandial hyperglycemia remaining the main barrier to improved control.  He continues to under-bolus for meals in order to leave himself a "cushion" against hypoglycemia.  The Dexcom has nonetheless demonstrated some of the fluctuations of which he was previously unaware, including a somewhat pronounced maxi phenomenon.\par --Pt needs to download the Clarity katie to his phone to access for future visits\par --To try decreasing his pre-breakfast Novolog to 2 units given his tendency to hypoglycemia within 2 hours after his meal\par --Needs to avoid injecting into the lipohypertrophic areas on either side of his umbilicus\par --To try to inject meal boluses 15-20 min before starting to eat\par --Started to explain how an insulin pump would handle situations like the maxi phenomenon and decreased basal requirements when he was physically active\par --Needs to assess whether his overnight hypoglycemic reactions occur after he has taken a coverage dose of Novolog at bedtime\par \par 2) Hypercholesterolemia:  LDL-cholesterol level remains at his "technical" target of 100 mg%, but he would ideally be closer to 70 mg% and (by all current guidelines) be on statin therapy, which he continues to refuse.\par \par 3) Hypertension:  Systolic BP is again moderately elevated at his office visit.  Has not been monitoring at home.  Needs to be on an ACE or ARB for both optimizing BP and for nephro-protection\par --Asked him to begin monitoring BPs at home, target < 140/85, ideally < 130/80\par --He remains reluctant to start ACE or ARB\par \par 4) REYES:  Has likely remained stable.  Etiology unclear, though pt has had full cardiopulmonary workup.  Have seen this in other type 1 diabetics, often with evidence of calcification in the coronary arteries.  \par ?? whether he has calcified coronaries which are unable to dilate in response to exertion\par \par See for follow-up in 3-4 months.  CMP, lipids, A1c, microalb before visit

## 2023-01-16 NOTE — PHYSICAL EXAM
[Alert] : alert [Well Nourished] : well nourished [Healthy Appearance] : healthy appearance [Normal Sclera/Conjunctiva] : normal sclera/conjunctiva [EOMI] : extra ocular movement intact [PERRL] : pupils equal, round and reactive to light [No Proptosis] : no proptosis [No Lid Lag] : no lid lag [Normal Outer Ear/Nose] : the ears and nose were normal in appearance [No Neck Mass] : no neck mass was observed [No LAD] : no lymphadenopathy [Thyroid Not Enlarged] : the thyroid was not enlarged [No Thyroid Nodules] : no palpable thyroid nodules [Clear to Auscultation] : lungs were clear to auscultation bilaterally [No Murmurs] : no murmurs [Normal Rate] : heart rate was normal [Regular Rhythm] : with a regular rhythm [Carotids Normal] : carotid pulses were normal with no bruits [No Edema] : no peripheral edema [Not Tender] : non-tender [Soft] : abdomen soft [No HSM] : no hepato-splenomegaly [Normal Supraclavicular Nodes] : no supraclavicular lymphadenopathy [Normal Anterior Cervical Nodes] : no anterior cervical lymphadenopathy [No CVA Tenderness] : no ~M costovertebral angle tenderness [Normal Gait] : normal gait [No Involuntary Movements] : no involuntary movements were seen [No Joint Swelling] : no joint swelling seen [Normal Strength/Tone] : muscle strength and tone were normal [No Rash] : no rash [Normal] : normal [2+] : 2+ in the dorsalis pedis [Vibration Dec.] : diminished vibratory sensation at the level of the toes [No Tremors] : no tremors [Normal Sensation on Monofilament Testing] : normal sensation on monofilament testing of lower extremities [Normal Affect] : the affect was normal [Normal Mood] : the mood was normal [Kyphosis] : no kyphosis present [Acanthosis Nigricans] : no acanthosis nigricans [Foot Ulcers] : no foot ulcers [Delayed in the Right Toes] : normal in the toes [Delayed in the Left Toes] : normal in the toes [Position Sense Dec.] : normal position sense at the level of the toes [#1 Diminished] : number 1 was normal [#2 Diminished] : number 2 was normal [#3 Diminished] : number 3 was normal [#4 Diminished] : number 4 was normal [#5 Diminished] : number 5 was normal [#6 Diminished] : number 6 was normal [#7 Diminished] : number 7 was normal [#8 Diminished] : number 8 was normal [#9 Diminished] : number 9 was normal [#10 Diminished] : number 10 was normal [de-identified] : Faint corneal arcus, mostly the R eye [de-identified] : Hearing is minimally impaired with the hearing aids in place.   [de-identified] : DP pulses 2+ bilaterally [de-identified] : Areas of moderate lipohypertrophy on either side of the umbilicus

## 2023-01-16 NOTE — DATA REVIEWED
[FreeTextEntry1] : [facility]  (1/9/23)\par \par FBS 82,  A1c 8.4%\par CMP WNL\par , HDL 53, TG 71\par TSH 2.67 uU/ml  (0.36-3.74)\par

## 2023-07-28 ENCOUNTER — APPOINTMENT (OUTPATIENT)
Dept: ENDOCRINOLOGY | Facility: CLINIC | Age: 73
End: 2023-07-28
Payer: MEDICARE

## 2023-07-28 VITALS
SYSTOLIC BLOOD PRESSURE: 125 MMHG | TEMPERATURE: 97.7 F | OXYGEN SATURATION: 98 % | BODY MASS INDEX: 29.11 KG/M2 | WEIGHT: 179 LBS | HEART RATE: 68 BPM | DIASTOLIC BLOOD PRESSURE: 58 MMHG | HEIGHT: 65.75 IN

## 2023-07-28 DIAGNOSIS — R06.02 SHORTNESS OF BREATH: ICD-10-CM

## 2023-07-28 PROCEDURE — 99214 OFFICE O/P EST MOD 30 MIN: CPT

## 2023-07-30 PROBLEM — R06.02 EXERTIONAL SHORTNESS OF BREATH: Status: ACTIVE | Noted: 2018-06-09

## 2023-07-31 RX ORDER — GLUCAGON 1 MG
1 KIT INJECTION
Qty: 1 | Refills: 3 | Status: ACTIVE | COMMUNITY
Start: 2023-07-31 | End: 1900-01-01

## 2023-09-05 NOTE — REVIEW OF SYSTEMS
[Hearing Loss] : hearing loss [SOB on Exertion] : shortness of breath on exertion [Fatigue] : no fatigue [Decreased Appetite] : appetite not decreased [Fever] : no fever [Chills] : no chills [Dry Eyes] : no dryness [Blurred Vision] : no blurred vision [Eyes Itch] : no itch [Dysphagia] : no dysphagia [Chest Pain] : no chest pain [Palpitations] : no palpitations [Lower Ext Edema] : no lower extremity edema [Shortness Of Breath] : no shortness of breath [Cough] : no cough [Wheezing] : no wheezing [Nausea] : no nausea [Heartburn] : no heartburn [Diarrhea] : no diarrhea [Polyuria] : no polyuria [Dysuria] : no dysuria [Joint Pain] : no joint pain [Muscle Weakness] : no muscle weakness [Myalgia] : no myalgia  [Joint Stiffness] : no joint stiffness [Dry Skin] : no dry skin [Hair Loss] : no hair loss [Ulcer] : no ulcer [Headaches] : no headaches [Difficulty Walking] : no difficulty walking [Tremors] : no tremors [Depression] : no depression [Anxiety] : no anxiety [Stress] : no stress [Polydipsia] : no polydipsia [Easy Bleeding] : no ~M tendency for easy bleeding [Easy Bruising] : no tendency for easy bruising [FreeTextEntry2] : Has lost 5 lb since his last visit (though was unaware of this, and does not know how this occurred) [FreeTextEntry3] : Is still bothered by the large "floater" in his left eye.   [FreeTextEntry4] : Wearing hearing aids, but only socially--needs to wear ear protection at work [FreeTextEntry6] : REYES is unchanged [FreeTextEntry7] : Intermittent constipation [FreeTextEntry8] : Nocturia once a night.  No undue frequency during the day [de-identified] : See comments in the HPI re: intermittent nocturnal pain in the soles of his feet

## 2023-09-05 NOTE — PHYSICAL EXAM
[Alert] : alert [Well Nourished] : well nourished [Healthy Appearance] : healthy appearance [Normal Sclera/Conjunctiva] : normal sclera/conjunctiva [EOMI] : extra ocular movement intact [PERRL] : pupils equal, round and reactive to light [No Proptosis] : no proptosis [No Lid Lag] : no lid lag [Normal Outer Ear/Nose] : the ears and nose were normal in appearance [No Neck Mass] : no neck mass was observed [No LAD] : no lymphadenopathy [Thyroid Not Enlarged] : the thyroid was not enlarged [No Thyroid Nodules] : no palpable thyroid nodules [Clear to Auscultation] : lungs were clear to auscultation bilaterally [No Murmurs] : no murmurs [Normal Rate] : heart rate was normal [Regular Rhythm] : with a regular rhythm [Carotids Normal] : carotid pulses were normal with no bruits [No Edema] : no peripheral edema [Not Tender] : non-tender [Soft] : abdomen soft [No HSM] : no hepato-splenomegaly [Normal Supraclavicular Nodes] : no supraclavicular lymphadenopathy [Normal Anterior Cervical Nodes] : no anterior cervical lymphadenopathy [No CVA Tenderness] : no ~M costovertebral angle tenderness [Normal Gait] : normal gait [No Involuntary Movements] : no involuntary movements were seen [No Joint Swelling] : no joint swelling seen [Normal Strength/Tone] : muscle strength and tone were normal [No Rash] : no rash [2+] : 2+ in the dorsalis pedis [No Tremors] : no tremors [Normal Sensation on Monofilament Testing] : normal sensation on monofilament testing of lower extremities [Normal Affect] : the affect was normal [Normal Mood] : the mood was normal [Normal] : normal [Vibration Dec.] : diminished vibratory sensation at the level of the toes [Kyphosis] : no kyphosis present [Acanthosis Nigricans] : no acanthosis nigricans [Foot Ulcers] : no foot ulcers [Delayed in the Right Toes] : normal in the toes [Delayed in the Left Toes] : normal in the toes [Position Sense Dec.] : normal position sense at the level of the toes [#1 Diminished] : number 1 was normal [#2 Diminished] : number 2 was normal [#3 Diminished] : number 3 was normal [#4 Diminished] : number 4 was normal [#5 Diminished] : number 5 was normal [#6 Diminished] : number 6 was normal [#7 Diminished] : number 7 was normal [#8 Diminished] : number 8 was normal [#9 Diminished] : number 9 was normal [#10 Diminished] : number 10 was normal [de-identified] : Faint corneal arcus, mostly the R eye [de-identified] : Hearing is minimally impaired with the hearing aids in place.   [de-identified] : DP pulses 2+ bilaterally [de-identified] : Areas of moderate lipohypertrophy on either side of the umbilicus

## 2023-09-05 NOTE — HISTORY OF PRESENT ILLNESS
[FreeTextEntry1] : 73-year-old man who is followed for type I DM.  His diabetes was initially diagnosed in 2003, when he presented with marked hyperglycemia and ketosis.  He has never been in fransico DKA.  His glycemic control has been consistently sub-optimal, with A1c levels which are usually in the 7.5-8.5% range.  The main barriers to improved control are his intrinsic brittleness and his tendency to under-bolus for meals because of frequent hypoglycemia between meals as a result of his physical activity.  Despite the sub-optimal control, he has no complicating retinopathy or nephropathy.  His other active medical problems include BPH and chronic REYES.  A full cardiac work-up and PFTs were done when the REYES first manifested in 2018, but no cause could be found.  Has not had any acute illnesses or significant medial issues since his last visit. Is currently off the Dexcom because his transmitter failed and he had to "sacrifice" the sensor. He still has not downloaded the Clarity katie Taking 16 units of Basaglar qhs, pre-meal Novolog 3-4/4-5/8-10 --Is still having a bran cereal for breakfast.  Will add fruit only if his fingerstick before the meal s low --Brings a sandwich to work for lunch --Starch at supper is variable--If it is mashed potatoes, it will be one cup. AM fingersticks have been "150-180" with no overnight hypoglycemia.  (But he has noted that his blood sugar can be in the low 100s in the middle of the night and then rise 40 mg% by the time he awakens in the morning).  He admits that most of his post-prandial glucoses are near or slightly above 200 mg% Ophthalmology exams are yearly, and he is likely due.  He still has the large floater in his left eye Denies any paresthesias in his toes, but has occasional lancinating pains on the soles of his feet at night.

## 2023-09-05 NOTE — ASSESSMENT
[FreeTextEntry2] : Diet, post-prandial targets, maxi phenomenon [FreeTextEntry1] : 21) Type 1 DM: A1c level is mildly above goal, but is actually better than most of his previous values.  The man barrier to improved control remains his under-bolusing for meals, preferring to have a post-prandial glucose of near or above 200 mg% as a "cushion" against hypoglycemia while he is physically acitve at work.  His overnight pattern suggests that an increase in glargine would risk hypoglycemia at 2-3 AM.  His elevated pre-breakfast glucoses reflect what seems to be a rather pronounced maxi phenomenon. --To continue his current insulin regimen for now.  Encouraged him to try to increase his meal boluses to at least target his post-prandial glucoses to approximately 180 mg%, which should still be an adequate "cushion" against hypoglycemia. --Explained the maxi phenomenon to him, also explained how this is very difficult to treat on a Lantus-based regimen.  (He is still not interested on a pump, which would be the obvious solution) --Rx for Baqsimi to replace his  glucagon emergency kit  2) Hypercholesterolemia:  LDL-cholesterol level is below the "minimal" diabetic target of 100 mg%, but would prefer a value closer to 70 mg%.  The pt has refused statin therapy "unless absolutely necessary" --Continue to follow on diet only  3) Hypertension:  BP is excellent at today's visit.  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  Still suspect that it may be due either to small vessel CAD or to calcified coronary arteries (which are unable to dilate in response to exertion) --Will consider a trial or nitrates if the symptoms worsen  See for follow-up in 4-5 months.  CMP, lipids, A1c, TFTs before the visit

## 2023-09-05 NOTE — DATA REVIEWED
[FreeTextEntry1] : [facility]  (7/5/23)  ,  A1c 7.6 % CMP WNL LDL 96, HDL 56, TG 75 Urine microalbumin ratio negative at 3.0 (normal < 30) CBC WNL except borderline low WBC of 3700

## 2023-10-29 NOTE — HISTORY OF PRESENT ILLNESS
[FreeTextEntry1] : 72-year-old man who is followed for type I DM.  His diabetes was initially diagnosed in 2003, when he presented with marked hyperglycemia and ketosis.  He has never been in fransico DKA.  His glycemic control has been consistently sub-optimal, with A1c levels which are usually in the 7.5-8.5% range.  The main barriers to improved control are his intrinsic brittleness and his tendency to under-bolus for meals because of frequent hypoglycemia between meals as a result of his physical activity.  Despite the sub-optimal control, he has no complicating retinopathy or nephropathy.  His other active medical problems include BPH and chronic REYES.  A full cardiac work-up and PFTs were done when the REYES first manifested in 2018, but no cause could be found.\par \francis Has been using the Dexcom full-time, and now has it transmitting to his phone\francis Is still taking 16 units of Basaglar qhs, premeal Novolog 4/4-5/8-10\francis Has not yet downloaded the Clarity katie\par --AM glucoses have been in the mid-100 range, over 200 approximately 2 days/week\par --Has noted a definite maxi phenomenon, with a rise of approximately 30 mg% between wake-up (5:30 AM) and breakfast ( 7 AM)\par --Breakfast is still 1 1/2 cups of a bran cereal with almond milk, occasionally with a few prunes.  (Takes an extra unit of Novolog for the fruit).  Has been having more frequent hypoglycemic reactions as early as 2 hours after the meal\par ---Pre-lunch glucoses are 150-200, and he takes 4-5 units for the sandwich at that meal\par --Starch at supper tends to vary\par --Is taking Novolog coverage at bedtime twice a week, only if he is over 200.  He is unable to say whether his overnight hypoglycemic reactions occur more frequently if he has taken bedtime coverage.\francis Had influenza last month (confirmed by nasal swab, took Tamiflu) and still has significant fatigue\francis Has not had an ophth exam since his cataract surgery.  Still has a large floater in the left eye
STG (3-5 sessions) sit to stand/stand to sit CG

## 2023-11-03 ENCOUNTER — RX RENEWAL (OUTPATIENT)
Age: 73
End: 2023-11-03

## 2024-01-02 RX ORDER — GLUCAGON 3 MG/1
3 POWDER NASAL
Qty: 2 | Refills: 3 | Status: ACTIVE | COMMUNITY
Start: 2023-07-28 | End: 1900-01-01

## 2024-01-02 RX ORDER — BLOOD-GLUCOSE SENSOR
EACH MISCELLANEOUS
Qty: 9 | Refills: 3 | Status: ACTIVE | COMMUNITY
Start: 2021-09-21 | End: 1900-01-01

## 2024-01-02 RX ORDER — PEN NEEDLE, DIABETIC 29 G X1/2"
31G X 5 MM NEEDLE, DISPOSABLE MISCELLANEOUS
Qty: 400 | Refills: 3 | Status: ACTIVE | COMMUNITY
Start: 2018-03-04 | End: 1900-01-01

## 2024-01-16 ENCOUNTER — RX RENEWAL (OUTPATIENT)
Age: 74
End: 2024-01-16

## 2024-01-16 RX ORDER — BLOOD-GLUCOSE TRANSMITTER
EACH MISCELLANEOUS
Qty: 1 | Refills: 3 | Status: ACTIVE | COMMUNITY
Start: 2021-09-21 | End: 1900-01-01

## 2024-01-17 ENCOUNTER — APPOINTMENT (OUTPATIENT)
Dept: ENDOCRINOLOGY | Facility: CLINIC | Age: 74
End: 2024-01-17
Payer: MEDICARE

## 2024-01-17 VITALS
TEMPERATURE: 97.2 F | OXYGEN SATURATION: 97 % | SYSTOLIC BLOOD PRESSURE: 145 MMHG | BODY MASS INDEX: 30.09 KG/M2 | HEIGHT: 65.75 IN | WEIGHT: 185 LBS | DIASTOLIC BLOOD PRESSURE: 75 MMHG | HEART RATE: 73 BPM

## 2024-01-17 DIAGNOSIS — E78.00 PURE HYPERCHOLESTEROLEMIA, UNSPECIFIED: ICD-10-CM

## 2024-01-17 DIAGNOSIS — I10 ESSENTIAL (PRIMARY) HYPERTENSION: ICD-10-CM

## 2024-01-17 DIAGNOSIS — E10.9 TYPE 1 DIABETES MELLITUS W/OUT COMPLICATIONS: ICD-10-CM

## 2024-01-17 PROCEDURE — 99214 OFFICE O/P EST MOD 30 MIN: CPT

## 2024-01-17 PROCEDURE — G2211 COMPLEX E/M VISIT ADD ON: CPT

## 2024-02-09 PROBLEM — E10.9 TYPE 1 DIABETES MELLITUS: Status: ACTIVE | Noted: 2018-02-14

## 2024-02-09 PROBLEM — E78.00 PURE HYPERCHOLESTEROLEMIA: Status: ACTIVE | Noted: 2021-01-18

## 2024-02-09 PROBLEM — I10 HYPERTENSION: Status: ACTIVE | Noted: 2022-01-17

## 2024-03-03 NOTE — ASSESSMENT
[FreeTextEntry1] : 1) Type 1 DM:  A1c level remains moderately above goal, and in his usual range.  His hyperglycemia is likely occurring both in the morning and post-prandially, the latter due to his tendency to "under-bolus" for meals because of his fear of hypoglycemia.  The variability of his glucoses after supper is likely diet-related. --Given the elevated AM glucoses to near or above 200 when he does not take a correction dose for 2 AM hyperglycemia, to increase the Lantus back to 18 units --Again urged him to target his 2-hr post-prandial glucoses to at least below 180 mg%, which leaves him an adequate "cushion" against hypoglycemia --Needs to download the Clarity katie on his phone or computer.  (HIs wife can possibly do this) --Needs to avoid injecting into the lipohypertrophic areas of his abdominal wall --Urged him to take his premeal insulin closer to 20-30 min before the meal if possible, pointing out that this will likely improve his 2-hr pp glucose, and also lower his risk of delayed hypoglycemia --Encouraged him to decrease the portion sizes of poultry at supper, and increase his intake of fish. --He is likely interested in a second opinion about his macular edema, and I suggested that he see Joseph Luciano in his [facility] office --Finally, I emphasized that retinopathy is the diabetic complication which is most closely tied to glycemic control, and urged him to try to get his A1c level down to 7%  2) Hypercholesterolemia:  LDL-cholesterol level is only slightly above his target of 70 mg% without statin therapy, which he has been unwilling to start.  Did not "push" this issue today.  3) Hypertension:  BP was again moderately elevated today, as it has been at many of his office visits.  He would ideally be on an ACE or ARB for both BP control and nephro-protection, but he has refused to start one of these agents.  He nonetheless says today that he will "think about it."  See for follow-up in  4 months.  CMP, lipids, A1c, microalb before the visite [FreeTextEntry2] : Diet, timing of pre-meal injections, injection sites, correlation between retinopathy and glycemic control

## 2024-03-03 NOTE — REVIEW OF SYSTEMS
[Hearing Loss] : hearing loss [SOB on Exertion] : shortness of breath on exertion [Blurred Vision] : no blurred vision [Joint Pain] : no joint pain [Joint Stiffness] : no joint stiffness [Gas/Bloating] : gas/bloating [Fatigue] : no fatigue [Decreased Appetite] : appetite not decreased [Chills] : no chills [Fever] : no fever [Dry Eyes] : no dryness [Eyes Itch] : no itch [Palpitations] : no palpitations [Dysphagia] : no dysphagia [Chest Pain] : no chest pain [Shortness Of Breath] : no shortness of breath [Lower Ext Edema] : no lower extremity edema [Cough] : no cough [Nausea] : no nausea [Wheezing] : no wheezing [Constipation] : no constipation [Heartburn] : no heartburn [Diarrhea] : no diarrhea [Dysuria] : no dysuria [Polyuria] : no polyuria [Muscle Weakness] : no muscle weakness [Myalgia] : no myalgia  [Dry Skin] : no dry skin [Hair Loss] : no hair loss [Headaches] : no headaches [Ulcer] : no ulcer [Depression] : no depression [Difficulty Walking] : no difficulty walking [Tremors] : no tremors [Stress] : no stress [Anxiety] : no anxiety [Polydipsia] : no polydipsia [Cold Intolerance] : no cold intolerance [Heat Intolerance] : no heat intolerance [Easy Bleeding] : no ~M tendency for easy bleeding [Easy Bruising] : no tendency for easy bruising [FreeTextEntry2] : Has gained 6 lb since his last visit [FreeTextEntry4] : Wearing hearing aids, but only socially--needs to wear ear protection at work [FreeTextEntry6] : REYES is unchanged and still quite bothersome [FreeTextEntry3] : See comments in the HPI re: macular edema in his left eye.  (Is still bothered by the large "floater" in that eye)   [FreeTextEntry8] : Nocturia once a night.  No undue frequency during the day [FreeTextEntry7] : Has had more reflux and bloating symptoms recently  [FreeTextEntry9] : See comments in the HPI re: meniscus tears in his left knee [de-identified] : See comments in the HPI re: intermittent nocturnal pain in his feet

## 2024-03-03 NOTE — HISTORY OF PRESENT ILLNESS
[FreeTextEntry1] : 74-year-old man who is followed for type I DM.  His diabetes was initially diagnosed in 2003, when he presented with marked hyperglycemia and ketosis.  He has never been in fransico DKA.  His glycemic control has been consistently sub-optimal, with A1c levels which are usually in the 7.5-8.5% range.  The main barriers to improved control are his intrinsic brittleness and his tendency to under-bolus for meals because of frequent hypoglycemia between meals as a result of his physical activity.  Despite the sub-optimal control, he has no complicating retinopathy or nephropathy.  His other active medical problems include BPH and chronic REYES.  A full cardiac work-up and PFTs were done when the REYES first manifested in 2018, but no cause could be found.  Started to develop blurred vision in his left eye a few months ago, and was found to have macular edema.  Was told that he needs  intravitreal injections, and will be starting these next week.  He does not seem to be having any metamorphopsia.  He still has the large "floater" in that eye. Ophthalmologist is Dr. Kevon Chapman. He has also been diagnosed with torn medial and lateral menisci in his left knee, likely the result of a twisting injury at work.  Saw an orthopedist who told him that X-rays show significant arthritis in the knee, but no surgery was suggested for either the arthritis (TKR) or the meniscus tears.  Has done PT with significant improvement.   Has been having problems with his Dexcom sensors being dislodged by physical activity at work.   He has still not downloaded the Clarity katie.   Is taking 16 units of Basaglar at bedtime.  Has noted a rise in his blood sugar at 2 AM, sometimes to over 250 mg%, and will take a correction dose of approximately 3 units when this occurs. If he does not spike and take a correction dose, his AM glucose will be over 200. Meal doses of Novolog are now 4/5/8-10 His glucoses after breakfast (which is still cereal) are near or above 200.  Glucoses after lunch are closer to 150.  Post-supper values are widely variable. Takes his pre-breakfast Novolog approximately 15 min before the meal, but cannot do this with his lunch dose.  Takes his pre-supper dose 10 min before the meal Has a "dull" type of pain in his feet at night, but no paresthesias or subjective numbness

## 2024-03-03 NOTE — DATA REVIEWED
[FreeTextEntry1] : [facility]  (1/10/24)  ,  A1c 7.8% CMP WNL LDL 79, HDL 52,  TSH level normal at 3.26 uU/ml  (0.4-4.5) PSA 1.10 ng/ml

## 2024-03-03 NOTE — PHYSICAL EXAM
[Alert] : alert [Well Nourished] : well nourished [Healthy Appearance] : healthy appearance [Normal Sclera/Conjunctiva] : normal sclera/conjunctiva [EOMI] : extra ocular movement intact [PERRL] : pupils equal, round and reactive to light [No Proptosis] : no proptosis [No Lid Lag] : no lid lag [Normal Outer Ear/Nose] : the ears and nose were normal in appearance [No Neck Mass] : no neck mass was observed [No LAD] : no lymphadenopathy [Thyroid Not Enlarged] : the thyroid was not enlarged [No Thyroid Nodules] : no palpable thyroid nodules [Clear to Auscultation] : lungs were clear to auscultation bilaterally [No Murmurs] : no murmurs [Normal Rate] : heart rate was normal [Regular Rhythm] : with a regular rhythm [Carotids Normal] : carotid pulses were normal with no bruits [No Edema] : no peripheral edema [Not Tender] : non-tender [Soft] : abdomen soft [No HSM] : no hepato-splenomegaly [Normal Supraclavicular Nodes] : no supraclavicular lymphadenopathy [Normal Anterior Cervical Nodes] : no anterior cervical lymphadenopathy [No CVA Tenderness] : no ~M costovertebral angle tenderness [Normal Gait] : normal gait [No Involuntary Movements] : no involuntary movements were seen [No Joint Swelling] : no joint swelling seen [Normal Strength/Tone] : muscle strength and tone were normal [No Rash] : no rash [Normal] : normal [2+] : 2+ in the dorsalis pedis [Vibration Dec.] : diminished vibratory sensation at the level of the toes [No Tremors] : no tremors [Normal Sensation on Monofilament Testing] : normal sensation on monofilament testing of lower extremities [Normal Affect] : the affect was normal [Normal Mood] : the mood was normal [Kyphosis] : no kyphosis present [Delayed in the Right Toes] : normal in the toes [Foot Ulcers] : no foot ulcers [Acanthosis Nigricans] : no acanthosis nigricans [Delayed in the Left Toes] : normal in the toes [Position Sense Dec.] : normal position sense at the level of the toes [#1 Diminished] : number 1 was normal [#2 Diminished] : number 2 was normal [#3 Diminished] : number 3 was normal [#4 Diminished] : number 4 was normal [#5 Diminished] : number 5 was normal [#8 Diminished] : number 8 was normal [#7 Diminished] : number 7 was normal [#6 Diminished] : number 6 was normal [#10 Diminished] : number 10 was normal [#9 Diminished] : number 9 was normal [de-identified] : Faint corneal arcus, mostly the R eye [de-identified] : DP pulses 2+ bilaterally [de-identified] : Hearing is minimally impaired with the hearing aids in place.   [de-identified] : No definite swelling in his L knee [de-identified] : Areas of moderate lipohypertrophy on either side of the umbilicus

## 2024-03-11 RX ORDER — INSULIN GLARGINE 100 [IU]/ML
100 INJECTION, SOLUTION SUBCUTANEOUS
Qty: 5 | Refills: 3 | Status: ACTIVE | COMMUNITY
Start: 2024-03-11 | End: 1900-01-01

## 2024-03-12 RX ORDER — INSULIN GLARGINE 100 [IU]/ML
100 INJECTION, SOLUTION SUBCUTANEOUS
Qty: 5 | Refills: 3 | Status: ACTIVE | COMMUNITY
Start: 2018-04-02 | End: 1900-01-01

## 2024-04-09 RX ORDER — INSULIN LISPRO 100 [IU]/ML
100 INJECTION, SOLUTION INTRAVENOUS; SUBCUTANEOUS
Qty: 10 | Refills: 3 | Status: DISCONTINUED | COMMUNITY
Start: 2024-04-05 | End: 2024-04-09

## 2024-04-09 RX ORDER — INSULIN ASPART 100 [IU]/ML
100 INJECTION, SOLUTION INTRAVENOUS; SUBCUTANEOUS
Qty: 2 | Refills: 3 | Status: DISCONTINUED | COMMUNITY
Start: 2018-06-09 | End: 2024-04-09

## 2024-04-09 RX ORDER — INSULIN ASPART INJECTION 100 [IU]/ML
100 INJECTION, SOLUTION SUBCUTANEOUS
Qty: 2 | Refills: 3 | Status: ACTIVE | COMMUNITY
Start: 2024-04-09 | End: 1900-01-01

## 2024-07-25 ENCOUNTER — APPOINTMENT (OUTPATIENT)
Dept: ENDOCRINOLOGY | Facility: CLINIC | Age: 74
End: 2024-07-25

## 2024-07-25 VITALS
BODY MASS INDEX: 30.66 KG/M2 | OXYGEN SATURATION: 96 % | HEART RATE: 72 BPM | TEMPERATURE: 97.7 F | SYSTOLIC BLOOD PRESSURE: 144 MMHG | HEIGHT: 65 IN | WEIGHT: 184 LBS | DIASTOLIC BLOOD PRESSURE: 81 MMHG | RESPIRATION RATE: 16 BRPM

## 2024-07-25 PROCEDURE — 99214 OFFICE O/P EST MOD 30 MIN: CPT

## 2024-07-25 PROCEDURE — G2211 COMPLEX E/M VISIT ADD ON: CPT

## 2024-07-25 NOTE — HISTORY OF PRESENT ILLNESS
[FreeTextEntry1] : 74-year-old man who is followed for type I DM.  His diabetes was initially diagnosed in 2003, when he presented with marked hyperglycemia and ketosis.  He has never been in fransico DKA.  His glycemic control has been consistently sub-optimal, with A1c levels which are usually in the 7.5-8.5% range.  The main barriers to improved control are his intrinsic brittleness and his tendency to under-bolus for meals because of frequent hypoglycemia between meals as a result of his physical activity.  Despite the sub-optimal control, he has no complicating retinopathy or nephropathy.  His other active medical problems include BPH and chronic REYES.  A full cardiac work-up and PFTs were done when the REYES first manifested in 2018, but no cause could be found.  Finished the PT for his L knee, and has minimal discomfort at this point.  Has continued doing the prescribed exercises on a very consistent basis.  Developed pain in his R knee, however, and was given a steroid injection with a good result.  Was told by the orthopedist that he is a candidate for bilateral knee replacement.  He is also considering having Synvisc injections in the R knee. Is receiving monthly intravitreal Eylea injections in his L eye.  Notes resolution of the blurred vision (and metamorphopsia) after the injections, but only for a few days. He has now had a total of 6 injections, but is being "tapered" to every 6 weeks.  He still has a large "floater" in that eye Ophthalmologist is Dr. Kevon Chapman  [phone] Has still not downloaded the Clarity katie on his phone.   Has found some adhesive patches on line to put over the sensors to prevent their becoming dislodged when he is working  Has been taking 18 units of Lantus qhs, and his glucoses at wake-up have been frequently in the 100-150 range--i.e. more stable and better He is still taking only 3 units of Novolog before his cereeal at breakfast, but then exercises on the stationary bike for 30 min approximately 1/2 hour afterward, and will frequently become hypoglycemic.  Has been treating the hypoglycemia with either an "energy drink" or bar.  He is not sure how many of glucose he is getting from these, but he is usually up to near 200 before lunch Taking 6 units of Novolog for lunch, which is almost always just a sandwich Glucoses after lunch are near 150 mg%, and he remains fairly stable through the afternoon. Taking 8-10 units Novolog before supper.  Glucoses after supper are variable, seem to depend on the type of starch, but he has not assessed the response to specific starches.  Denies any numbness or paresthesias in his feet.        Started to develop blurred vision in his left eye a few months ago, and was found to have macular edema.  Was told that he needs  intravitreal injections, and will be starting these next week.  He does not seem to be having any metamorphopsia.  He still has the large "floater" in that eye. Ophthalmologist is Dr. Kevon Chapman. He has also been diagnosed with torn medial and lateral menisci in his left knee, likely the result of a twisting injury at work.  Saw an orthopedist who told him that X-rays show significant arthritis in the knee, but no surgery was suggested for either the arthritis (TKR) or the meniscus tears.  Has done PT with significant improvement.   Has been having problems with his Dexcom sensors being dislodged by physical activity at work.   He has still not downloaded the Clarity katie.   Is taking 16 units of Basaglar at bedtime.  Has noted a rise in his blood sugar at 2 AM, sometimes to over 250 mg%, and will take a correction dose of approximately 3 units when this occurs. If he does not spike and take a correction dose, his AM glucose will be over 200. Meal doses of Novolog are now 4/5/8-10 His glucoses after breakfast (which is still cereal) are near or above 200.  Glucoses after lunch are closer to 150.  Post-supper values are widely variable. Takes his pre-breakfast Novolog approximately 15 min before the meal, but cannot do this with his lunch dose.  Takes his pre-supper dose 10 min before the meal Has a "dull" type of pain in his feet at night, but no paresthesias or subjective numbness

## 2024-07-25 NOTE — ASSESSMENT
[FreeTextEntry1] : Hypo after breakfs--?? no NL before the meal, ?? precautionary snack before the exercise More attention to variation in glucose after different starches at supper--iravinder says that he "crashes" after sweet potatoes Skin check Sleep study to rule out ALBINO Clarity system did not push the statin or arb today emphasized connedction between glycemia adn retinopahty 3 + CBC, PSA

## 2024-07-25 NOTE — REVIEW OF SYSTEMS
[Hearing Loss] : hearing loss [SOB on Exertion] : shortness of breath on exertion [Gas/Bloating] : gas/bloating [Fatigue] : no fatigue [Decreased Appetite] : appetite not decreased [Recent Weight Gain (___ Lbs)] : no recent weight gain [Recent Weight Loss (___ Lbs)] : no recent weight loss [Fever] : no fever [Chills] : no chills [Dry Eyes] : no dryness [Eyes Itch] : no itch [Dysphagia] : no dysphagia [Chest Pain] : no chest pain [Palpitations] : no palpitations [Lower Ext Edema] : no lower extremity edema [Shortness Of Breath] : no shortness of breath [Cough] : no cough [Wheezing] : no wheezing [Nausea] : no nausea [Constipation] : no constipation [Heartburn] : no heartburn [Diarrhea] : no diarrhea [Polyuria] : no polyuria [Dysuria] : no dysuria [Muscle Weakness] : no muscle weakness [Myalgia] : no myalgia  [Dry Skin] : no dry skin [Hair Loss] : no hair loss [Ulcer] : no ulcer [Headaches] : no headaches [Difficulty Walking] : no difficulty walking [Tremors] : no tremors [Pain/Numbness of Digits] : no pain/numbness of digits [Depression] : no depression [Anxiety] : no anxiety [Stress] : no stress [Polydipsia] : no polydipsia [Cold Intolerance] : no cold intolerance [Heat Intolerance] : no heat intolerance [Easy Bleeding] : no ~M tendency for easy bleeding [Easy Bruising] : no tendency for easy bruising [FreeTextEntry3] : See comments in the HPI re: macular edema in his left eye.  (Is still bothered by the large "floater" in that eye)   [FreeTextEntry4] : Wearing hearing aids, but only socially--needs to wear ear protection at work [FreeTextEntry6] : REYES is unchanged.  Tends to get attacks of bronchitis twice a year.  Wife complains that he snores at night [FreeTextEntry8] : Nocturia once a night.  No undue frequency during the day [FreeTextEntry9] : See comments in the HPI re: arthritis in his knees [de-identified] : Has not had a skin check in several years

## 2024-07-25 NOTE — DATA REVIEWED
[FreeTextEntry1] : "[facility]  (7/15/24)  FBS 96,  A1c 8.0% CMP WNL LDL 83, HDL 54, TG 62 Urine microalbumin ratio negative at 4.0  (normal < 30) TSH level normal at 3.21 uU/ml  (0.4-4;5)

## 2024-07-25 NOTE — PHYSICAL EXAM
[Alert] : alert [Well Nourished] : well nourished [Healthy Appearance] : healthy appearance [Normal Sclera/Conjunctiva] : normal sclera/conjunctiva [EOMI] : extra ocular movement intact [PERRL] : pupils equal, round and reactive to light [No Proptosis] : no proptosis [No Lid Lag] : no lid lag [Normal Outer Ear/Nose] : the ears and nose were normal in appearance [No Neck Mass] : no neck mass was observed [No LAD] : no lymphadenopathy [Thyroid Not Enlarged] : the thyroid was not enlarged [No Thyroid Nodules] : no palpable thyroid nodules [Clear to Auscultation] : lungs were clear to auscultation bilaterally [No Murmurs] : no murmurs [Normal Rate] : heart rate was normal [Regular Rhythm] : with a regular rhythm [Carotids Normal] : carotid pulses were normal with no bruits [No Edema] : no peripheral edema [Not Tender] : non-tender [Soft] : abdomen soft [No HSM] : no hepato-splenomegaly [Normal Supraclavicular Nodes] : no supraclavicular lymphadenopathy [Normal Anterior Cervical Nodes] : no anterior cervical lymphadenopathy [No CVA Tenderness] : no ~M costovertebral angle tenderness [Normal Gait] : normal gait [No Involuntary Movements] : no involuntary movements were seen [Normal Strength/Tone] : muscle strength and tone were normal [No Rash] : no rash [Normal] : normal [2+] : 2+ in the dorsalis pedis [Vibration Dec.] : diminished vibratory sensation at the level of the toes [No Tremors] : no tremors [Normal Sensation on Monofilament Testing] : normal sensation on monofilament testing of lower extremities [Normal Affect] : the affect was normal [Normal Mood] : the mood was normal [Kyphosis] : no kyphosis present [Acanthosis Nigricans] : no acanthosis nigricans [Foot Ulcers] : no foot ulcers [Delayed in the Right Toes] : normal in the toes [Delayed in the Left Toes] : normal in the toes [Position Sense Dec.] : normal position sense at the level of the toes [#1 Diminished] : number 1 was normal [#2 Diminished] : number 2 was normal [#3 Diminished] : number 3 was normal [#4 Diminished] : number 4 was normal [#5 Diminished] : number 5 was normal [#6 Diminished] : number 6 was normal [#7 Diminished] : number 7 was normal [#8 Diminished] : number 8 was normal [#9 Diminished] : number 9 was normal [#10 Diminished] : number 10 was normal [de-identified] : Faint corneal arcus, mostly the R eye [de-identified] : Hearing is minimally impaired with the hearing aids in place.   [de-identified] : DP pulses 2+ bilaterally [de-identified] : Areas of moderate lipohypertrophy on either side of the umbilicus, though these seem to be regressing [de-identified] : No definite swelling in his L knee

## 2024-10-28 NOTE — HISTORY OF PRESENT ILLNESS
[FreeTextEntry1] : 69-year-old man who is followed for type I DM.  His diabetes was initially diagnosed in 2003, when he presented with marked hyperglycemia and ketosis.  He has never been in fransico DKA.  His glycemic control has been consistently sub-optimal, with A1c levels which are usually in the 7.5-8.5% range.  The main barriers to improved control are his intrinsic brittleness and his tendency to under-bolus for meals because of frequent hypoglycemia between meals as a result of his physical activity.  Despite the sub-optimal control, he has no complicating retinopathy or nephropathy.  His other active medical problems include BPH and chronic REYES.  A full cardiac work-up and PFTs were done when the REYES first manifested in 2018, but no cause could be found.\francis \francis Has not had any significant medical issues since his last visit, and has no new physical complaints.  Still has REYES, but says "I'm used to it."\francis Blood sugars are about the same.  Did not bring a log of his readings, but had been testing 4-5X/day before he ran out of strips last week. (Says that his pharmacy has been strictly following Medicare restrictions regarding quantities of strips).\francis Has decreased the Basaglar dose to 15 units qhs.  Has had only one overnight hypoglycemic reaction which awakened him, but he does not remember his bedtime fingerstick from the night before, nor whether he had taken any Humalog coverage in addition to his lantus dose that night.  He also notes inexplicably high readings in the morning at least once a week, and suspects that he might have "bounced."  Denies any obvious night sweats, etc but says that he has occasional nights when he "sleeps very lightly" and realizes that he might be low.  On those occasions when he tests, his readings will be in the 40-60 range.  If his fingerstick at bedtime is in the 100-150 range and he takes only his usual 15 units of Lantus, his blood sugar will remain fairly stable overnight.\francis Is taking 3 units of Humalog before breakfast (plus coverage if >200), and is usually having just cold cereal most mornings\francis Fingersticks before his sandwich at lunch are variable, and > 180 approximately 1/3 of the time.  Takes 4 units of Humalog before this meal but does not test 2 hrs afterward.  Hypoglycemic reactions in the afternoon are infrequent..  \francis Usual starch at supper is potatoes or rice.  Takes 8 units before this meal, sometimes 9-10 for rice.  \francis Has not been kayaking at night, but was doing more sailboat racing (often 3X/week) over the summer.\francis Is extremely happy with his hearing aids, (and says that his wife is also extremely happy that he has them)\francis Denies any numbness or paresthesias in his feet.\francis Will be seeing his urologist for his yearly visit within the next few months.
Opt out

## 2024-11-06 ENCOUNTER — APPOINTMENT (OUTPATIENT)
Dept: ENDOCRINOLOGY | Facility: CLINIC | Age: 74
End: 2024-11-06
Payer: MEDICARE

## 2024-11-06 VITALS
SYSTOLIC BLOOD PRESSURE: 125 MMHG | DIASTOLIC BLOOD PRESSURE: 70 MMHG | OXYGEN SATURATION: 97 % | HEART RATE: 76 BPM | TEMPERATURE: 98 F | BODY MASS INDEX: 30.24 KG/M2 | WEIGHT: 181.5 LBS | HEIGHT: 65 IN

## 2024-11-06 DIAGNOSIS — Z01.818 ENCOUNTER FOR OTHER PREPROCEDURAL EXAMINATION: ICD-10-CM

## 2024-11-06 DIAGNOSIS — E10.9 TYPE 1 DIABETES MELLITUS W/OUT COMPLICATIONS: ICD-10-CM

## 2024-11-06 DIAGNOSIS — I10 ESSENTIAL (PRIMARY) HYPERTENSION: ICD-10-CM

## 2024-11-06 DIAGNOSIS — E78.00 PURE HYPERCHOLESTEROLEMIA, UNSPECIFIED: ICD-10-CM

## 2024-11-06 PROCEDURE — 99214 OFFICE O/P EST MOD 30 MIN: CPT

## 2024-11-06 PROCEDURE — G2211 COMPLEX E/M VISIT ADD ON: CPT

## 2024-11-10 PROBLEM — Z01.818 ENCOUNTER FOR PRE-OPERATIVE EXAMINATION: Status: ACTIVE | Noted: 2024-11-10 | Resolved: 2024-11-24

## 2025-01-07 ENCOUNTER — RX RENEWAL (OUTPATIENT)
Age: 75
End: 2025-01-07

## 2025-01-22 ENCOUNTER — APPOINTMENT (OUTPATIENT)
Dept: ENDOCRINOLOGY | Facility: CLINIC | Age: 75
End: 2025-01-22

## 2025-02-03 ENCOUNTER — RX RENEWAL (OUTPATIENT)
Age: 75
End: 2025-02-03

## 2025-03-27 RX ORDER — INSULIN ASPART 100 [IU]/ML
100 INJECTION, SOLUTION INTRAVENOUS; SUBCUTANEOUS
Qty: 2 | Refills: 3 | Status: ACTIVE | COMMUNITY
Start: 2025-03-27 | End: 1900-01-01

## 2025-04-15 ENCOUNTER — RX RENEWAL (OUTPATIENT)
Age: 75
End: 2025-04-15

## 2025-04-17 ENCOUNTER — APPOINTMENT (OUTPATIENT)
Dept: ENDOCRINOLOGY | Facility: CLINIC | Age: 75
End: 2025-04-17

## 2025-04-17 ENCOUNTER — NON-APPOINTMENT (OUTPATIENT)
Age: 75
End: 2025-04-17

## 2025-04-17 VITALS
WEIGHT: 185.4 LBS | OXYGEN SATURATION: 98 % | TEMPERATURE: 97.8 F | HEIGHT: 65 IN | BODY MASS INDEX: 30.89 KG/M2 | SYSTOLIC BLOOD PRESSURE: 146 MMHG | HEART RATE: 67 BPM | DIASTOLIC BLOOD PRESSURE: 89 MMHG

## 2025-04-17 PROCEDURE — 99214 OFFICE O/P EST MOD 30 MIN: CPT

## 2025-04-17 PROCEDURE — G2211 COMPLEX E/M VISIT ADD ON: CPT

## 2025-05-07 ENCOUNTER — APPOINTMENT (OUTPATIENT)
Dept: ENDOCRINOLOGY | Facility: CLINIC | Age: 75
End: 2025-05-07

## 2025-08-14 ENCOUNTER — APPOINTMENT (OUTPATIENT)
Dept: ENDOCRINOLOGY | Facility: CLINIC | Age: 75
End: 2025-08-14

## 2025-08-25 ENCOUNTER — APPOINTMENT (OUTPATIENT)
Dept: ENDOCRINOLOGY | Facility: CLINIC | Age: 75
End: 2025-08-25

## 2025-08-25 VITALS
HEART RATE: 77 BPM | SYSTOLIC BLOOD PRESSURE: 147 MMHG | TEMPERATURE: 96.6 F | WEIGHT: 181 LBS | DIASTOLIC BLOOD PRESSURE: 72 MMHG | HEIGHT: 65 IN | OXYGEN SATURATION: 97 % | BODY MASS INDEX: 30.16 KG/M2

## 2025-08-25 DIAGNOSIS — I10 ESSENTIAL (PRIMARY) HYPERTENSION: ICD-10-CM

## 2025-08-25 DIAGNOSIS — E10.9 TYPE 1 DIABETES MELLITUS W/OUT COMPLICATIONS: ICD-10-CM

## 2025-08-25 DIAGNOSIS — E78.00 PURE HYPERCHOLESTEROLEMIA, UNSPECIFIED: ICD-10-CM

## 2025-08-25 PROCEDURE — 99214 OFFICE O/P EST MOD 30 MIN: CPT

## 2025-08-25 PROCEDURE — G2211 COMPLEX E/M VISIT ADD ON: CPT
